# Patient Record
Sex: MALE | Race: WHITE | NOT HISPANIC OR LATINO | Employment: FULL TIME | URBAN - NONMETROPOLITAN AREA
[De-identification: names, ages, dates, MRNs, and addresses within clinical notes are randomized per-mention and may not be internally consistent; named-entity substitution may affect disease eponyms.]

---

## 2018-03-10 ENCOUNTER — HOSPITAL ENCOUNTER (EMERGENCY)
Facility: HOSPITAL | Age: 28
Discharge: HOME | End: 2018-03-10
Attending: FAMILY MEDICINE | Admitting: FAMILY MEDICINE

## 2018-03-10 VITALS
RESPIRATION RATE: 16 BRPM | TEMPERATURE: 98.2 F | OXYGEN SATURATION: 94 % | DIASTOLIC BLOOD PRESSURE: 82 MMHG | HEART RATE: 95 BPM | SYSTOLIC BLOOD PRESSURE: 138 MMHG

## 2018-03-10 DIAGNOSIS — S67.32XA: ICD-10-CM

## 2018-03-10 PROCEDURE — 27210995 ZZH RX 272: Performed by: FAMILY MEDICINE

## 2018-03-10 PROCEDURE — 25000132 ZZH RX MED GY IP 250 OP 250 PS 637: Performed by: FAMILY MEDICINE

## 2018-03-10 PROCEDURE — 99283 EMERGENCY DEPT VISIT LOW MDM: CPT

## 2018-03-10 PROCEDURE — 99283 EMERGENCY DEPT VISIT LOW MDM: CPT | Performed by: FAMILY MEDICINE

## 2018-03-10 RX ORDER — HYDROCODONE BITARTRATE AND ACETAMINOPHEN 5; 325 MG/1; MG/1
2 TABLET ORAL EVERY 6 HOURS PRN
Qty: 15 TABLET | Refills: 0 | Status: SHIPPED | OUTPATIENT
Start: 2018-03-10 | End: 2019-04-22

## 2018-03-10 RX ORDER — HYDROCODONE BITARTRATE AND ACETAMINOPHEN 5; 325 MG/1; MG/1
2 TABLET ORAL ONCE
Status: COMPLETED | OUTPATIENT
Start: 2018-03-10 | End: 2018-03-10

## 2018-03-10 RX ORDER — IBUPROFEN 800 MG/1
800 TABLET, FILM COATED ORAL ONCE
Status: COMPLETED | OUTPATIENT
Start: 2018-03-10 | End: 2018-03-10

## 2018-03-10 RX ORDER — IBUPROFEN 800 MG/1
800 TABLET, FILM COATED ORAL EVERY 8 HOURS PRN
Qty: 60 TABLET | Refills: 0 | Status: SHIPPED | OUTPATIENT
Start: 2018-03-10 | End: 2018-03-18

## 2018-03-10 RX ADMIN — IBUPROFEN 800 MG: 800 TABLET ORAL at 16:26

## 2018-03-10 RX ADMIN — HYDROCODONE BITARTRATE AND ACETAMINOPHEN 2 TABLET: 5; 325 TABLET ORAL at 16:26

## 2018-03-10 RX ADMIN — Medication: at 16:27

## 2018-03-10 ASSESSMENT — ENCOUNTER SYMPTOMS
ACTIVITY CHANGE: 1
DIAPHORESIS: 0
WEAKNESS: 0
PSYCHIATRIC NEGATIVE: 1
MYALGIAS: 1
GASTROINTESTINAL NEGATIVE: 1
FATIGUE: 0
ARTHRALGIAS: 1
NUMBNESS: 1
SHORTNESS OF BREATH: 0
FEVER: 0

## 2018-03-10 NOTE — ED PROVIDER NOTES
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  By the time he was able to get it out there was a small laceration on the ventral side at the flexure of the wrist and hand, and there was also a abrasion on the dorsal side.  The laceration is flap-like, superficial, does not require suturing.  The patient's wrist is extremely painful and the hand is jenae in color, capillary refill is less than 1 second.  Patient is having 8 of 10 pain, states that shoots from his wrist down into his hand and up almost to his elbow.    Problem List:    There are no active problems to display for this patient.     Past Medical History:    No past medical history on file.    Past Surgical History:    No past surgical history on file.    Family History:    No family history on file.    Social History:  Marital Status:  Single [1]  Social History   Substance Use Topics     Smoking status: Not on file     Smokeless tobacco: Not on file     Alcohol use Not on file        Medications:      CITALOPRAM HYDROBROMIDE PO   Omeprazole (PRILOSEC PO)   LISINOPRIL PO   ibuprofen (ADVIL/MOTRIN) 800 MG tablet   HYDROcodone-acetaminophen (NORCO) 5-325 MG per tablet         Review of Systems   Constitutional: Positive for activity change. Negative for diaphoresis, fatigue and fever.   HENT: Negative.         Did not fall or hit head.   Respiratory: Negative for shortness of breath.    Cardiovascular: Negative for chest pain.   Gastrointestinal: Negative.    Genitourinary: Negative.    Musculoskeletal: Positive for arthralgias and myalgias.        Right wrist   Skin: Negative.    Neurological: Positive for numbness. Negative for weakness.        Fingers on left hand partially numb.  Normal handgrasp.   Psychiatric/Behavioral: Negative.         Physical Exam   BP: 115/54  Pulse: 95  Heart Rate: 88  Temp: 98  F (36.7  C)  Resp: 16  SpO2: 99 %      Physical Exam   Constitutional: He is oriented to person, place, and time. He appears well-developed and well-nourished. No distress.   Cardiovascular: Normal rate, regular rhythm and normal heart sounds.    No murmur heard.  Pulmonary/Chest: Effort normal and breath sounds normal. No respiratory distress.   Musculoskeletal: He exhibits edema and tenderness. He exhibits no deformity.        Left wrist: He exhibits decreased range of motion, tenderness and swelling. He exhibits no deformity.   Neurological: He is alert and oriented to person, place, and time.   Skin: Skin is warm and dry.   Psychiatric: He has a normal mood and affect.   Nursing note and vitals reviewed.      ED Course     ED Course     Procedures    No results found for this or any previous visit (from the past 24 hour(s)).    Assessments & Plan (with Medical Decision Making)   Superficial laceration on the patient's wrist was glued using skin adhesive.  The wrist was wrapped lightly with gauze for protection, placed in a Velcro wrist splint.  Patient was advised to keep the hand above the level of the elbow and to move the fingers and exercise them while in splint.  He can remove the splint for bathing, advised him to wear it continuously for 4 days otherwise.  Patient returns to the emergency room or urgent care for pain could consider x-ray.  At this time patient had no bony tenderness on palpation.  He did have significant soft tissue tenderness.  Given ibuprofen and Norco for pain in the emergency room, same prescription sent home with him for use as needed.  Did advise him to take the ibuprofen regularly for 3 days.  Follow-up as needed.    I have reviewed the nursing notes.    I have reviewed the findings, diagnosis, plan and need for follow up with the patient.  Discharge Medication List as of 3/10/2018  5:16 PM      START taking  these medications    Details   ibuprofen (ADVIL/MOTRIN) 800 MG tablet Take 1 tablet (800 mg) by mouth every 8 hours as needed for moderate pain (take regularly for 3 days), Disp-60 tablet, R-0, E-Prescribe      HYDROcodone-acetaminophen (NORCO) 5-325 MG per tablet Take 2 tablets by mouth every 6 hours as needed for moderate to severe pain, Disp-15 tablet, R-0, Local Print             Final diagnoses:   Crush injury, wrist, left, initial encounter       3/10/2018   [facility]     Bronwyn Cline MD  03/10/18 6816

## 2018-03-10 NOTE — ED NOTES
Discharge instructions reviewed with patient with no questions or concerns. Written Rx sent with patient. Aware of Rx to be picked up. Sling and wrist brace placed on patient by this writer. Vital signs stable.

## 2018-03-10 NOTE — ED NOTES
Patient arrives by self with laceration to left wrist. 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 6-7 cm linear  laceration noted to left wrist. Patient's currently rating the pain to hand 8/10. Bleeding controlled. CMS intact. Call light within reach.

## 2018-03-10 NOTE — ED AVS SNAPSHOT
[facility]    [address] [address]    Phone:  [phone]                                       Carter Ervin   MRN: [id number]    Department:  [facility]   Date of Visit:  3/10/2018           Patient Information     Date Of Birth          1990        Your diagnoses for this visit were:     Crush injury, wrist, left, initial encounter        You were seen by Bronwyn Cline MD.      Follow-up Information     Follow up with [facility] In 2 days.    Specialty:  EMERGENCY MEDICINE    Why:  As needed, If symptoms worsen, or fail to improve    Contact information:    [address] [address] [phone]    Additional information:    From [facility]: Take [address]. Turn left at [address]. Turn left at the first stoplight on [address]. At the first stop sign, take a right onto [address]. Take a left into the parking lot and continue through until you reach the North enterance of the building.       From [place]: Take [address]. Take the [address] ramp towards [place]. Turn left onto [address]. Take a slight right onto [address] [facility]. Turn left at the first stoplight on [address]. At the first stop sign, take a right onto [address]. Take a left into the parking lot and continue through until you reach the North enterance of the building.       From [place]: Take [address]. Take a right at [address]. At the first stop sign, take a right onto [address]. Take a left into the parking lot and continue through until you reach the North enterance of the building.       Discharge References/Attachments     SOFT TISSUE CONTUSION (ENGLISH)         Review of your medicines      START taking        Dose / Directions Last dose taken    HYDROcodone-acetaminophen 5-325 MG per tablet   Commonly known as:  NORCO   Dose:  2 tablet  "  Quantity:  15 tablet        Take 2 tablets by mouth every 6 hours as needed for moderate to severe pain   Refills:  0        ibuprofen 800 MG tablet   Commonly known as:  ADVIL/MOTRIN   Dose:  800 mg   Quantity:  60 tablet        Take 1 tablet (800 mg) by mouth every 8 hours as needed for moderate pain (take regularly for 3 days)   Refills:  0          Our records show that you are taking the medicines listed below. If these are incorrect, please call your family doctor or clinic.        Dose / Directions Last dose taken    CITALOPRAM HYDROBROMIDE PO        Refills:  0        LISINOPRIL PO        Refills:  0        PRILOSEC PO        Refills:  0                Prescriptions were sent or printed at these locations (2 Prescriptions)                   [id number] - [place], [place] - [address] AT [facility] [address], [place] [address]    Telephone:  [phone]   Fax:  [phone]   Hours:                  E-Prescribed (1 of 2)         ibuprofen (ADVIL/MOTRIN) 800 MG tablet                 Printed at Department/Unit printer (1 of 2)         HYDROcodone-acetaminophen (NORCO) 5-325 MG per tablet                Orders Needing Specimen Collection     None      Pending Results     No orders found from 3/8/2018 to 3/11/2018.            Pending Culture Results     No orders found from 3/8/2018 to 3/11/2018.            Thank you for choosing [facility]       Thank you for choosing [facility] for your care. Our goal is always to provide you with excellent care. Hearing back from our patients is one way we can continue to improve our services. Please take a few minutes to complete the written survey that you may receive in the mail after you visit with us. Thank you!        [employer] Information     [employer] lets you send messages to your doctor, view your test results, renew your prescriptions, schedule appointments and more. To sign up, go to [email or web address] . Click on \"Log in\" on " "the left side of the screen, which will take you to the Welcome page. Then click on \"Sign up Now\" on the right side of the page.     You will be asked to enter the access code listed below, as well as some personal information. Please follow the directions to create your username and password.     Your access code is: [email or web address]  Expires: 2018  5:16 PM     Your access code will  in 90 days. If you need help or a new code, please call your [facility] clinic or [phone].        Care EveryWhere ID     This is your Care EveryWhere ID. This could be used by other organizations to access your [facility] medical records  [id number]        Equal Access to Services     BOB YANEZ : Meron Lucero, barrera sue, lenard smith, hyacinth martinez. So [facility] [phone].    ATENCIÓN: Si habla español, tiene a pal disposición servicios gratuitos de asistencia lingüística. Llame al [phone].    We comply with applicable federal civil rights laws and [place] laws. We do not discriminate on the basis of race, color, national origin, age, disability, sex, sexual orientation, or gender identity.            After Visit Summary       This is your record. Keep this with you and show to your community pharmacist(s) and doctor(s) at your next visit.                  "

## 2018-03-10 NOTE — ED AVS SNAPSHOT
[facility]    [address] [place] [address]    Phone:  [phone]                                       Carter Ervin   MRN: [id number]    Department:  [facility]   Date of Visit:  3/10/2018           After Visit Summary Signature Page     I have received my discharge instructions, and my questions have been answered. I have discussed any challenges I see with this plan with the nurse or doctor.    ..........................................................................................................................................  Patient/Patient Representative Signature      ..........................................................................................................................................  Patient Representative Print Name and Relationship to Patient    ..................................................               ................................................  Date                                            Time    ..........................................................................................................................................  Reviewed by Signature/Title    ...................................................              ..............................................  Date                                                            Time

## 2018-11-12 ENCOUNTER — HOSPITAL ENCOUNTER (EMERGENCY)
Facility: HOSPITAL | Age: 28
Discharge: HOME | End: 2018-11-12
Attending: PHYSICIAN ASSISTANT | Admitting: PHYSICIAN ASSISTANT

## 2018-11-12 VITALS
TEMPERATURE: 97.4 F | SYSTOLIC BLOOD PRESSURE: 135 MMHG | DIASTOLIC BLOOD PRESSURE: 97 MMHG | OXYGEN SATURATION: 99 % | RESPIRATION RATE: 16 BRPM

## 2018-11-12 DIAGNOSIS — R51.9 SINUS HEADACHE: ICD-10-CM

## 2018-11-12 DIAGNOSIS — J20.8 ACUTE BRONCHITIS DUE TO OTHER SPECIFIED ORGANISMS: ICD-10-CM

## 2018-11-12 DIAGNOSIS — J01.10 ACUTE FRONTAL SINUSITIS, RECURRENCE NOT SPECIFIED: ICD-10-CM

## 2018-11-12 PROCEDURE — 99202 OFFICE O/P NEW SF 15 MIN: CPT | Performed by: PHYSICIAN ASSISTANT

## 2018-11-12 PROCEDURE — G0463 HOSPITAL OUTPT CLINIC VISIT: HCPCS

## 2018-11-12 RX ORDER — GUAIFENESIN AND PSEUDOEPHEDRINE HCL 1200; 120 MG/1; MG/1
1 TABLET, EXTENDED RELEASE ORAL 2 TIMES DAILY
Qty: 28 TABLET | Refills: 0 | Status: SHIPPED | OUTPATIENT
Start: 2018-11-12 | End: 2019-04-22

## 2018-11-12 RX ORDER — KETOROLAC TROMETHAMINE 10 MG/1
10 TABLET, FILM COATED ORAL EVERY 6 HOURS PRN
Qty: 10 TABLET | Refills: 0 | Status: SHIPPED | OUTPATIENT
Start: 2018-11-12 | End: 2019-04-22

## 2018-11-12 ASSESSMENT — ENCOUNTER SYMPTOMS
SINUS PAIN: 1
EYE DISCHARGE: 0
CARDIOVASCULAR NEGATIVE: 1
NECK STIFFNESS: 0
TROUBLE SWALLOWING: 0
FEVER: 0
COUGH: 1
VOICE CHANGE: 0
SINUS PRESSURE: 1
SORE THROAT: 0
ABDOMINAL PAIN: 0
HEADACHES: 1
DIZZINESS: 0
NECK PAIN: 0
PSYCHIATRIC NEGATIVE: 1
APPETITE CHANGE: 0
DIARRHEA: 0
LIGHT-HEADEDNESS: 0
EYE REDNESS: 0
VOMITING: 0
FATIGUE: 1
NAUSEA: 0

## 2018-11-12 NOTE — ED AVS SNAPSHOT
[facility]    [address] [place] [address]    Phone:  [phone]                                       Carter Ervin   MRN: [id number]    Department:  [facility]   Date of Visit:  11/12/2018           After Visit Summary Signature Page     I have received my discharge instructions, and my questions have been answered. I have discussed any challenges I see with this plan with the nurse or doctor.    ..........................................................................................................................................  Patient/Patient Representative Signature      ..........................................................................................................................................  Patient Representative Print Name and Relationship to Patient    ..................................................               ................................................  Date                                   Time    ..........................................................................................................................................  Reviewed by Signature/Title    ...................................................              ..............................................  Date                                               Time          22EPIC Rev 08/18

## 2018-11-12 NOTE — ED PROVIDER NOTES
History     Chief Complaint   Patient presents with     Headache     c/o h/a     Cough     c/o horrible cough     The history is provided by the patient. No  was used.     Crater Evrin is a 28 year old male who has 2.5 weeks frontal sinus pain/pressure and now has a sinus headache and decreased energy. No n/v/d/f/c.  No rash. No change in b/b habits. No sore throat          PMH: not pertinent  PSH: not pertinent  FHX: not pertinent    Social History:  Marital Status:  Single [1]  Social History   Substance Use Topics     Smoking status: Not on file     Smokeless tobacco: Not on file     Alcohol use Not on file        Medications:      amoxicillin-clavulanate (AUGMENTIN) 875-125 MG per tablet   ketorolac (TORADOL) 10 MG tablet   PseudoePHEDrine-guaiFENesin 120-1200 MG TB12   CITALOPRAM HYDROBROMIDE PO   HYDROcodone-acetaminophen (NORCO) 5-325 MG per tablet   LISINOPRIL PO   Omeprazole (PRILOSEC PO)         Review of Systems   Constitutional: Positive for fatigue. Negative for appetite change and fever.   HENT: Positive for congestion, sinus pain and sinus pressure. Negative for ear pain, sore throat, trouble swallowing and voice change.    Eyes: Negative for discharge and redness.   Respiratory: Positive for cough.    Cardiovascular: Negative.    Gastrointestinal: Negative for abdominal pain, diarrhea, nausea and vomiting.   Genitourinary: Negative.    Musculoskeletal: Negative for neck pain and neck stiffness.   Skin: Negative for rash.   Neurological: Positive for headaches. Negative for dizziness and light-headedness.   Psychiatric/Behavioral: Negative.        Physical Exam   BP: 135/97  Heart Rate: 69  Temp: 97.4  F (36.3  C)  Resp: 16  SpO2: 99 %      Physical Exam   Constitutional: He is oriented to person, place, and time. He appears well-developed and well-nourished. No distress.   HENT:   Head: Normocephalic and atraumatic.   Right Ear: External ear normal.   Left Ear: External ear  normal.   Mouth/Throat: Oropharynx is clear and moist.   Bilateral TMs/canals clear/wnl  frontal sinus TTP     Eyes: Conjunctivae and EOM are normal. Right eye exhibits no discharge. Left eye exhibits no discharge.   Neck: Normal range of motion. Neck supple.   Cardiovascular: Normal rate, regular rhythm and normal heart sounds.    Pulmonary/Chest: Effort normal and breath sounds normal. No respiratory distress.   Abdominal: Soft. Bowel sounds are normal. He exhibits no distension. There is no tenderness.   Neurological: He is alert and oriented to person, place, and time.   Skin: Skin is warm and dry. No rash noted. He is not diaphoretic.   Psychiatric: He has a normal mood and affect.   Nursing note and vitals reviewed.      ED Course     ED Course     Procedures            No results found for this or any previous visit (from the past 24 hour(s)).    Medications - No data to display    Assessments & Plan (with Medical Decision Making)     I have reviewed the nursing notes.    I have reviewed the findings, diagnosis, plan and need for follow up with the patient.      Discharge Medication List as of 11/12/2018  2:23 PM      START taking these medications    Details   amoxicillin-clavulanate (AUGMENTIN) 875-125 MG per tablet Take 1 tablet by mouth 2 times daily, Disp-20 tablet, R-0, E-Prescribe      ketorolac (TORADOL) 10 MG tablet Take 1 tablet (10 mg) by mouth every 6 hours as needed for moderate pain, Disp-10 tablet, R-0, E-Prescribe      PseudoePHEDrine-guaiFENesin 120-1200 MG TB12 Take 1 tablet by mouth 2 times daily, Disp-28 tablet, R-0, Local Print             Final diagnoses:   Acute frontal sinusitis, recurrence not specified   Sinus headache   Acute bronchitis due to other specified organisms         Give children's motrin as directed on the bottle as directed as needed for pain/swelling or fever.   Increase fluids, wash hands often.  Parent verbally educated and given appropriate education sheets for the  diagnoses and has no questions.  Give medications as directed.   Follow up with Primary Care provider if symptoms increase or if concerns develop, return to the ER  Fanta Becker Certified  Physician Assistant  11/12/2018  3:01 PM  URGENT CARE CLINIC  11/12/2018   [facility]     Fanta Becker PA  11/12/18 3056

## 2018-11-12 NOTE — ED AVS SNAPSHOT
[facility]    [address] [address]    Phone:  [phone]                                       Carter Ervin   MRN: [id number]    Department:  [facility]   Date of Visit:  11/12/2018           Patient Information     Date Of Birth          1990        Your diagnoses for this visit were:     Acute frontal sinusitis, recurrence not specified     Sinus headache     Acute bronchitis due to other specified organisms        You were seen by Fanta Becker PA.      Follow-up Information     Please follow up.    Why:  If symptoms worsen, with a Primary Care Provider or Urgent Care        Follow up with [facility].    Specialty:  EMERGENCY MEDICINE    Why:  If further concerns develop    Contact information:    [address] [address] [phone]    Additional information:    From [facility]: Take [address]. Turn left at [address]. Turn left at the first stoplight on [address]. At the first stop sign, take a right onto [address]. Take a left into the parking lot and continue through until you reach the North enterance of the building.       From [place]: Take [address]. Take the [address] ramp towards [place]. Turn left onto [address]. Take a slight right onto [address]. Turn left at the first stoplight on [address]. At the first stop sign, take a right onto [address]. Take a left into the parking lot and continue through until you reach the North enterance of the building.       From [place]: Take [address]. Take a right at [address]. At the first stop sign, take a right onto [address]. Take a left into the parking lot and continue through until you reach the North enterance of the building.       Discharge References/Attachments     HEADACHES, SINUS (ENGLISH)    SINUSITIS (ANTIBIOTIC TREATMENT) (ENGLISH)    BRONCHITIS,  ANTIBIOTIC TREATMENT (ADULT) (ENGLISH)         Review of your medicines      START taking        Dose / Directions Last dose taken    amoxicillin-clavulanate 875-125 MG per tablet   Commonly known as:  AUGMENTIN   Dose:  1 tablet   Quantity:  20 tablet        Take 1 tablet by mouth 2 times daily   Refills:  0        ketorolac 10 MG tablet   Commonly known as:  TORADOL   Dose:  10 mg   Quantity:  10 tablet        Take 1 tablet (10 mg) by mouth every 6 hours as needed for moderate pain   Refills:  0        PseudoePHEDrine-guaiFENesin 120-1200 MG Tb12   Dose:  1 tablet   Quantity:  28 tablet        Take 1 tablet by mouth 2 times daily   Refills:  0          Our records show that you are taking the medicines listed below. If these are incorrect, please call your family doctor or clinic.        Dose / Directions Last dose taken    CITALOPRAM HYDROBROMIDE PO        Refills:  0        HYDROcodone-acetaminophen 5-325 MG per tablet   Commonly known as:  NORCO   Dose:  2 tablet   Quantity:  15 tablet        Take 2 tablets by mouth every 6 hours as needed for moderate to severe pain   Refills:  0        LISINOPRIL PO        Refills:  0        PRILOSEC PO        Refills:  0                Prescriptions were sent or printed at these locations (3 Prescriptions)                   [id number] - [place] - [address] [address] [address] [place] [address]    Telephone:  [phone]   Fax:  [phone]   Hours:                  E-Prescribed (2 of 3)         amoxicillin-clavulanate (AUGMENTIN) 875-125 MG per tablet               ketorolac (TORADOL) 10 MG tablet                 Printed at Department/Unit printer (1 of 3)         PseudoePHEDrine-guaiFENesin 120-1200 MG TB12                Orders Needing Specimen Collection     None      Pending Results     No orders found from 11/10/2018 to 11/13/2018.            Pending Culture Results     No orders found from 11/10/2018 to 11/13/2018.            Thank you for  "choosing [facility]       Thank you for choosing [facility] for your care. Our goal is always to provide you with excellent care. Hearing back from our patients is one way we can continue to improve our services. Please take a few minutes to complete the written survey that you may receive in the mail after you visit with us. Thank you!        [facility] Information     [employer] lets you send messages to your doctor, view your test results, renew your prescriptions, schedule appointments and more. To sign up, go to [email or web address] . Click on \"Log in\" on the left side of the screen, which will take you to the Welcome page. Then click on \"Sign up Now\" on the right side of the page.     You will be asked to enter the access code listed below, as well as some personal information. Please follow the directions to create your username and password.     Your access code is: [email or web address]  Expires: 2/10/2019  2:23 PM     Your access code will  in 90 days. If you need help or a new code, please call your [facility] clinic or [phone].        Care EveryWhere ID     This is your Care EveryWhere ID. This could be used by other organizations to access your [facility] medical records  [id number]        Equal Access to Services     BOB YANEZ AH: Meron kleino Sofiona, waaxda luqadaha, qaybta kaalmada adeegyada, hyacinth martinez. So [facility] [phone].    ATENCIÓN: Si habla español, tiene a pal disposición servicios gratuitos de asistencia lingüística. Llame al [phone].    We comply with applicable federal civil rights laws and [place] laws. We do not discriminate on the basis of race, color, national origin, age, disability, sex, sexual orientation, or gender identity.            After Visit Summary       This is your record. Keep this with you and show to your community pharmacist(s) and doctor(s) at your next visit.                  "

## 2019-04-22 ENCOUNTER — HOSPITAL ENCOUNTER (EMERGENCY)
Facility: HOSPITAL | Age: 29
Discharge: HOME | End: 2019-04-22
Attending: PHYSICIAN ASSISTANT | Admitting: PHYSICIAN ASSISTANT
Payer: COMMERCIAL

## 2019-04-22 ENCOUNTER — APPOINTMENT (OUTPATIENT)
Dept: GENERAL RADIOLOGY | Facility: HOSPITAL | Age: 29
End: 2019-04-22
Attending: NURSE PRACTITIONER
Payer: COMMERCIAL

## 2019-04-22 VITALS
RESPIRATION RATE: 20 BRPM | OXYGEN SATURATION: 97 % | BODY MASS INDEX: 27.4 KG/M2 | DIASTOLIC BLOOD PRESSURE: 86 MMHG | WEIGHT: 185 LBS | TEMPERATURE: 99.5 F | SYSTOLIC BLOOD PRESSURE: 134 MMHG | HEIGHT: 69 IN | HEART RATE: 104 BPM

## 2019-04-22 DIAGNOSIS — J06.9 VIRAL UPPER RESPIRATORY TRACT INFECTION WITH COUGH: ICD-10-CM

## 2019-04-22 LAB
DEPRECATED S PYO AG THROAT QL EIA: NORMAL
SPECIMEN SOURCE: NORMAL

## 2019-04-22 PROCEDURE — 71046 X-RAY EXAM CHEST 2 VIEWS: CPT | Mod: TC

## 2019-04-22 PROCEDURE — 99213 OFFICE O/P EST LOW 20 MIN: CPT | Mod: Z6 | Performed by: PHYSICIAN ASSISTANT

## 2019-04-22 PROCEDURE — 87880 STREP A ASSAY W/OPTIC: CPT | Performed by: NURSE PRACTITIONER

## 2019-04-22 PROCEDURE — G0463 HOSPITAL OUTPT CLINIC VISIT: HCPCS | Mod: 25

## 2019-04-22 PROCEDURE — G0463 HOSPITAL OUTPT CLINIC VISIT: HCPCS

## 2019-04-22 PROCEDURE — 87081 CULTURE SCREEN ONLY: CPT | Performed by: NURSE PRACTITIONER

## 2019-04-22 RX ORDER — BENZONATATE 100 MG/1
100 CAPSULE ORAL 3 TIMES DAILY PRN
Qty: 9 CAPSULE | Refills: 0 | Status: SHIPPED | OUTPATIENT
Start: 2019-04-22 | End: 2021-12-23

## 2019-04-22 ASSESSMENT — ENCOUNTER SYMPTOMS
TROUBLE SWALLOWING: 0
CARDIOVASCULAR NEGATIVE: 1
FATIGUE: 0
HEADACHES: 1
EYE REDNESS: 0
COUGH: 1
DIZZINESS: 0
NECK STIFFNESS: 0
NECK PAIN: 0
CHEST TIGHTNESS: 1
DIARRHEA: 0
SINUS PRESSURE: 1
EYE DISCHARGE: 0
VOMITING: 0
NAUSEA: 0
SHORTNESS OF BREATH: 1
RHINORRHEA: 1
SORE THROAT: 1
MYALGIAS: 0
SINUS PAIN: 1
ACTIVITY CHANGE: 1
LIGHT-HEADEDNESS: 0

## 2019-04-22 ASSESSMENT — MIFFLIN-ST. JEOR: SCORE: 1794.53

## 2019-04-22 NOTE — ED AVS SNAPSHOT
[facility]  [address] [address]  Phone:  [phone]                                    Carter Ervin   MRN: [id number]    Department:  [facility]   Date of Visit:  4/22/2019           After Visit Summary Signature Page    I have received my discharge instructions, and my questions have been answered. I have discussed any challenges I see with this plan with the nurse or doctor.    ..........................................................................................................................................  Patient/Patient Representative Signature      ..........................................................................................................................................  Patient Representative Print Name and Relationship to Patient    ..................................................               ................................................  Date                                   Time    ..........................................................................................................................................  Reviewed by Signature/Title    ...................................................              ..............................................  Date                                               Time          22EPIC Rev 08/18

## 2019-04-22 NOTE — ED PROVIDER NOTES
History     Chief Complaint   Patient presents with     Cough     5 day hx of productive cough of yellow green sputum, chest muscles sore     HPI  Carter Ervin is a 28 year old male who presents with a cough, congestion, sore throat, headaches, sinus pressure, and minimal shortness of breath that started five days ago. He is unsure if he has had fevers. He is an independent  and denies being around anyone else who has been sick. His chest is sore from coughing.     Allergies:  Allergies   Allergen Reactions     Morphine Other (See Comments)     insomnia       Problem List:    There are no active problems to display for this patient.       Past Medical History:    History reviewed. No pertinent past medical history.    Past Surgical History:    History reviewed. No pertinent surgical history.    Family History:    No family history on file.    Social History:  Marital Status:  Single [1]  Social History     Tobacco Use     Smoking status: None   Substance Use Topics     Alcohol use: None     Drug use: None        Medications:      benzonatate (TESSALON) 100 MG capsule   CITALOPRAM HYDROBROMIDE PO   LISINOPRIL PO   Omeprazole (PRILOSEC PO)         Review of Systems   Constitutional: Positive for activity change. Negative for fatigue.   HENT: Positive for congestion, postnasal drip, rhinorrhea, sinus pressure, sinus pain, sneezing and sore throat. Negative for ear pain and trouble swallowing.         Unsure if he has had fevers or chills.   Eyes: Negative for discharge and redness.   Respiratory: Positive for cough, chest tightness and shortness of breath.    Cardiovascular: Negative.    Gastrointestinal: Negative for diarrhea, nausea and vomiting.        Threw up this am from coughing   Musculoskeletal: Negative for myalgias, neck pain and neck stiffness.   Skin: Negative for rash.   Neurological: Positive for headaches. Negative for dizziness and light-headedness.       Physical Exam   BP:  "134/86  Pulse: 104  Temp: 99.5  F (37.5  C)  Resp: 20  Height: 175.3 cm (5' 9\")  Weight: 83.9 kg (185 lb)(stated)  SpO2: 97 %      Physical Exam   Constitutional: He is oriented to person, place, and time. He appears well-developed and well-nourished. He appears distressed.   HENT:   Head: Normocephalic.   Right Ear: External ear normal.   Left Ear: External ear normal.   Mouth/Throat: Oropharynx is clear and moist. No oropharyngeal exudate.   Eyes: Conjunctivae are normal.   Cardiovascular: Normal rate, regular rhythm and normal heart sounds.   No murmur heard.  Pulmonary/Chest: Effort normal and breath sounds normal. No respiratory distress. He has no wheezes. He has no rales. He exhibits tenderness.   Chest tenderness noted from coughing   Musculoskeletal: Normal range of motion.   Lymphadenopathy:     He has cervical adenopathy.        Left cervical: Superficial cervical adenopathy present. No posterior cervical adenopathy present.   Neurological: He is alert and oriented to person, place, and time.   Skin: Skin is warm and dry. No rash noted.   Psychiatric: He has a normal mood and affect.   Nursing note and vitals reviewed.      ED Course        Procedures               Results for orders placed or performed during the hospital encounter of 04/22/19 (from the past 24 hour(s))   Rapid strep screen   Result Value Ref Range    Specimen Description Throat     Rapid Strep A Screen       NEGATIVE: No Group A streptococcal antigen detected by immunoassay, await culture report.   Chest XR,  PA & LAT    Narrative    Procedure:XR CHEST 2 VW    Clinical history:Male, 28 years, shortness of breath, cough    Technique: Two views are submitted.    Comparison: None    Findings: The cardiac silhouette is normal. The pulmonary vasculature  is normal.    The lungs are clear. Bony structures are unremarkable.      Impression    Impression:   No acute abnormality. No evidence of acute or active disease.    EDDIE SHEFFIELD MD "       Medications - No data to display    Assessments & Plan (with Medical Decision Making)     I have reviewed the nursing notes.    I have reviewed the findings, diagnosis, plan and need for follow up with the patient.  Upper respiratory illness with cough, viral. Tessalon Perles for cough, treat other symptoms with decongestants . Discharge instructions reviewed and understanding verbalized.        Medication List      Started    benzonatate 100 MG capsule  Commonly known as:  TESSALON  100 mg, Oral, 3 TIMES DAILY PRN            Final diagnoses:   Viral upper respiratory tract infection with cough       4/22/2019   [facility]     Salena Glynn, CNP  04/22/19 [id number]

## 2019-04-22 NOTE — DISCHARGE INSTRUCTIONS
Treat body aches and pains with ibuprofen and acetaminophen. Follow-up with primary car provider if not better in 5 to 7 days

## 2019-04-22 NOTE — ED NOTES
Pt presents with cough for 5 days with chest pain from coughing along with headache. Productive cough with yellow sputum.

## 2019-04-24 LAB
BACTERIA SPEC CULT: NORMAL
SPECIMEN SOURCE: NORMAL

## 2020-03-24 ENCOUNTER — HOSPITAL ENCOUNTER (EMERGENCY)
Facility: HOSPITAL | Age: 30
Discharge: HOME | End: 2020-03-24
Attending: PHYSICIAN ASSISTANT | Admitting: PHYSICIAN ASSISTANT
Payer: COMMERCIAL

## 2020-03-24 VITALS
RESPIRATION RATE: 18 BRPM | DIASTOLIC BLOOD PRESSURE: 92 MMHG | SYSTOLIC BLOOD PRESSURE: 129 MMHG | TEMPERATURE: 98.1 F | OXYGEN SATURATION: 98 %

## 2020-03-24 DIAGNOSIS — J01.00 ACUTE MAXILLARY SINUSITIS, RECURRENCE NOT SPECIFIED: ICD-10-CM

## 2020-03-24 PROCEDURE — G0463 HOSPITAL OUTPT CLINIC VISIT: HCPCS

## 2020-03-24 PROCEDURE — 99213 OFFICE O/P EST LOW 20 MIN: CPT | Mod: Z6 | Performed by: PHYSICIAN ASSISTANT

## 2020-03-24 RX ORDER — ALBUTEROL SULFATE 90 UG/1
2-4 AEROSOL, METERED RESPIRATORY (INHALATION) EVERY 4 HOURS PRN
Qty: 1 INHALER | Refills: 0 | Status: SHIPPED | OUTPATIENT
Start: 2020-03-24 | End: 2021-12-23

## 2020-03-24 RX ORDER — AZITHROMYCIN 250 MG/1
TABLET, FILM COATED ORAL
Qty: 6 TABLET | Refills: 0 | Status: SHIPPED | OUTPATIENT
Start: 2020-03-24 | End: 2020-03-29

## 2020-03-24 RX ORDER — IBUPROFEN 800 MG/1
800 TABLET, FILM COATED ORAL EVERY 6 HOURS PRN
Qty: 60 TABLET | Refills: 0 | Status: SHIPPED | OUTPATIENT
Start: 2020-03-24 | End: 2020-04-01

## 2020-03-24 NOTE — ED AVS SNAPSHOT
[facility]  [address] [address]  Phone:  [phone]                                    Carter Ervin   MRN: [id number]    Department:  [facility]   Date of Visit:  3/24/2020           After Visit Summary Signature Page    I have received my discharge instructions, and my questions have been answered. I have discussed any challenges I see with this plan with the nurse or doctor.    ..........................................................................................................................................  Patient/Patient Representative Signature      ..........................................................................................................................................  Patient Representative Print Name and Relationship to Patient    ..................................................               ................................................  Date                                   Time    ..........................................................................................................................................  Reviewed by Signature/Title    ...................................................              ..............................................  Date                                               Time          22EPIC Rev 08/18

## 2020-03-24 NOTE — DISCHARGE INSTRUCTIONS
Ibuprofen as needed for headache and pain.  May take Tylenol 1 g every 6 hours do not exceed 4000 mg in 24-hour period.  Zithromax is the antibiotic.  Albuterol inhaler as needed.  Return for concerns new or worsening symptoms.

## 2020-03-24 NOTE — ED TRIAGE NOTES
"Pt presents today with c/o \"sick' for 7 day. Headache, nasal congestion, chest congestion, and worsening cough. No travel recently. No contact with any known covid-19. Pt has been trying day-quil and nyquil OTC.   "

## 2020-03-24 NOTE — ED PROVIDER NOTES
History     Chief Complaint   Patient presents with     Nasal Congestion     Cough     HPI  Carter Ervin is a 29 year old male who presents here with a 7-day history of nasal congestion sinus pain headache cough.  No fever or chills.  He does have a headache with the sinuses.  No neck pain or stiffness.  No sore throat.  No earache.  He does have a cough is nonproductive.  He is a non-smoker no history of asthma although he has used an inhaler in the past.  Not short of breath no abdominal pain no nausea vomiting diarrhea no chest pain.  No known Covid19 exposure    Allergies:  Allergies   Allergen Reactions     Morphine Other (See Comments)     insomnia       Problem List:    There are no active problems to display for this patient.       Past Medical History:    No past medical history on file.    Past Surgical History:    No past surgical history on file.    Family History:    No family history on file.    Social History:  Marital Status:  Single [1]  Social History     Tobacco Use     Smoking status: Not on file   Substance Use Topics     Alcohol use: Not on file     Drug use: Not on file        Medications:    albuterol (PROAIR HFA/PROVENTIL HFA/VENTOLIN HFA) 108 (90 Base) MCG/ACT inhaler  azithromycin (ZITHROMAX Z-SARAH) 250 MG tablet  CITALOPRAM HYDROBROMIDE PO  ibuprofen (ADVIL/MOTRIN) 800 MG tablet  LISINOPRIL PO  Omeprazole (PRILOSEC PO)  benzonatate (TESSALON) 100 MG capsule          Review of Systems  I did do a complete 10 point review of systems. Pertinent positives and negatives listed per HPI. All other systems have been reviewed and are negative.      Physical Exam   BP: 129/92  Heart Rate: 88  Temp: 98.1  F (36.7  C)  Resp: 18  SpO2: 98 %      Physical Exam  Vitals signs and nursing note reviewed.   Constitutional:       General: He is not in acute distress.     Appearance: Normal appearance. He is normal weight. He is not ill-appearing, toxic-appearing or diaphoretic.   HENT:      Head:  Normocephalic and atraumatic.      Nose:      Comments: Bilateral maxillary and frontal sinus tenderness.  Maxillary sinuses are exquisitely tender.  Eyes:      General: No scleral icterus.     Conjunctiva/sclera: Conjunctivae normal.   Neck:      Musculoskeletal: Neck supple. No neck rigidity.   Cardiovascular:      Rate and Rhythm: Normal rate and regular rhythm.   Pulmonary:      Effort: Pulmonary effort is normal. No respiratory distress.      Breath sounds: Normal breath sounds.   Musculoskeletal:         General: No swelling or signs of injury.   Skin:     General: Skin is warm and dry.   Neurological:      General: No focal deficit present.      Mental Status: He is alert and oriented to person, place, and time.   Psychiatric:         Mood and Affect: Mood normal.         Behavior: Behavior normal.         Thought Content: Thought content normal.         Judgment: Judgment normal.         ED Course      Clinically has sinusitis and upper respiratory infection will improve by azithromycin given albuterol inhaler since he has been used 1 in the past.  Have him take ibuprofen and Tylenol for pain.  Turn for concerns new or worsening symptoms.           No results found for this or any previous visit (from the past 24 hour(s)).    Medications - No data to display    Assessments & Plan (with Medical Decision Making)     I have reviewed the nursing notes.    I have reviewed the findings, diagnosis, plan and need for follow up with the patient.      Discharge Medication List as of 3/24/2020 11:23 AM      START taking these medications    Details   albuterol (PROAIR HFA/PROVENTIL HFA/VENTOLIN HFA) 108 (90 Base) MCG/ACT inhaler Inhale 2-4 puffs into the lungs every 4 hours as needed for shortness of breath / dyspnea or wheezing, Disp-1 Inhaler,R-0, E-Prescribe      azithromycin (ZITHROMAX Z-SARAH) 250 MG tablet Two tablets on the first day, then one tablet daily for the next 4 days, Disp-6 tablet,R-0, E-Prescribe       ibuprofen (ADVIL/MOTRIN) 800 MG tablet Take 1 tablet (800 mg) by mouth every 6 hours as needed for moderate pain or fever, Disp-60 tablet,R-0, E-Prescribe             Final diagnoses:   Acute maxillary sinusitis, recurrence not specified   Bronchittis    3/24/2020   [facility]

## 2020-03-24 NOTE — ED TRIAGE NOTES
Patient arrives by self for evaluation of cough, nasal congestion, and body aches for the past week. No fevers.

## 2021-12-23 ENCOUNTER — HOSPITAL ENCOUNTER (EMERGENCY)
Facility: HOSPITAL | Age: 31
Discharge: HOME | End: 2021-12-23
Attending: PHYSICIAN ASSISTANT | Admitting: PHYSICIAN ASSISTANT
Payer: COMMERCIAL

## 2021-12-23 VITALS — TEMPERATURE: 97.6 F | OXYGEN SATURATION: 98 % | RESPIRATION RATE: 18 BRPM | HEART RATE: 75 BPM

## 2021-12-23 DIAGNOSIS — Z76.0 ENCOUNTER FOR MEDICATION REFILL: ICD-10-CM

## 2021-12-23 PROCEDURE — G0463 HOSPITAL OUTPT CLINIC VISIT: HCPCS

## 2021-12-23 PROCEDURE — 99213 OFFICE O/P EST LOW 20 MIN: CPT | Performed by: PHYSICIAN ASSISTANT

## 2021-12-23 RX ORDER — LISINOPRIL 10 MG/1
TABLET ORAL
COMMUNITY
Start: 2021-09-16

## 2021-12-23 RX ORDER — BUSPIRONE HYDROCHLORIDE 5 MG/1
TABLET ORAL
COMMUNITY
Start: 2021-02-22 | End: 2023-04-10

## 2021-12-23 RX ORDER — TRAZODONE HYDROCHLORIDE 50 MG/1
50 TABLET, FILM COATED ORAL AT BEDTIME
COMMUNITY
End: 2024-06-06

## 2021-12-23 RX ORDER — LISINOPRIL 10 MG/1
10 TABLET ORAL DAILY
Qty: 14 TABLET | Refills: 0 | Status: SHIPPED | OUTPATIENT
Start: 2021-12-23 | End: 2022-03-31

## 2021-12-23 RX ORDER — CITALOPRAM HYDROBROMIDE 40 MG/1
TABLET ORAL
COMMUNITY
Start: 2021-09-16 | End: 2024-06-12

## 2021-12-23 RX ORDER — CITALOPRAM HYDROBROMIDE 40 MG/1
40 TABLET ORAL DAILY
Qty: 14 TABLET | Refills: 0 | Status: SHIPPED | OUTPATIENT
Start: 2021-12-23 | End: 2022-03-31

## 2021-12-23 NOTE — ED TRIAGE NOTES
Patient ran out of all medication and is requesting refills on Citalopram, Lisinopril, Omeprazole and trazodone.  Patient has not established with a PCP.  He states his doctor retired -Dr. Blackwell out of [place].

## 2021-12-23 NOTE — ED PROVIDER NOTES
History     Chief Complaint   Patient presents with     medication refills     HPI  Carter Ervin is a 31 year old male who presents to urgent care for refills on multiple medications. He states that his primary care provider in [place] retired approximately 1 month ago. He states that he ran out of his medications yesterday and that he thought he had one refill left. Patient is asking for refills of his trazodone, lisinopril, omeprazole, citalopram.    Allergies:  Allergies   Allergen Reactions     Morphine Other (See Comments)     insomnia       Problem List:    There are no problems to display for this patient.       Past Medical History:    No past medical history on file.    Past Surgical History:    No past surgical history on file.    Family History:    No family history on file.    Social History:  Marital Status:  Single [1]  Social History     Tobacco Use     Smoking status: Not on file     Smokeless tobacco: Not on file   Substance Use Topics     Alcohol use: Not on file     Drug use: Not on file        Medications:    citalopram (CELEXA) 40 MG tablet  lisinopril (ZESTRIL) 10 MG tablet  omeprazole (PRILOSEC) 20 MG DR capsule  traZODone (DESYREL) 50 MG tablet  busPIRone (BUSPAR) 5 MG tablet  citalopram (CELEXA) 40 MG tablet  lisinopril (ZESTRIL) 10 MG tablet  omeprazole (PRILOSEC) 20 MG DR capsule          Review of Systems   All other systems reviewed and are negative.      Physical Exam   Pulse: 75  Temp: 97.6  F (36.4  C)  Resp: 18  SpO2: 98 %      Physical Exam  Vitals and nursing note reviewed.   Constitutional:       Appearance: Normal appearance. He is not ill-appearing.   Cardiovascular:      Rate and Rhythm: Regular rhythm.      Heart sounds: Normal heart sounds.   Pulmonary:      Breath sounds: Normal breath sounds.   Neurological:      Mental Status: He is oriented to person, place, and time.         ED Course                 Procedures             Critical Care time:               No  results found for this or any previous visit (from the past 24 hour(s)).    Medications - No data to display    Assessments & Plan (with Medical Decision Making)   #1. Medication refill    Discussed exam findings with patient. I refilled patient's lisinopril, omeprazole and citalopram, for 14 days. I explained to patient that I would not refill his trazodone and that he needs to establish care with a primary care provider soon as possible. Patient verbalized understanding and agreement of plan.    I have reviewed the nursing notes.    I have reviewed the findings, diagnosis, plan and need for follow up with the patient.    New Prescriptions    CITALOPRAM (CELEXA) 40 MG TABLET    Take 1 tablet (40 mg) by mouth daily for 14 days    LISINOPRIL (ZESTRIL) 10 MG TABLET    Take 1 tablet (10 mg) by mouth daily for 14 days    OMEPRAZOLE (PRILOSEC) 20 MG DR CAPSULE    Take 1 capsule (20 mg) by mouth daily for 14 days       Final diagnoses:   Encounter for medication refill       12/23/2021   [facility]     Alex Feliciano PA-C  12/23/21 [id number]

## 2022-03-31 ENCOUNTER — HOSPITAL ENCOUNTER (EMERGENCY)
Facility: HOSPITAL | Age: 32
Discharge: HOME | End: 2022-03-31
Attending: NURSE PRACTITIONER | Admitting: NURSE PRACTITIONER
Payer: COMMERCIAL

## 2022-03-31 VITALS
SYSTOLIC BLOOD PRESSURE: 136 MMHG | TEMPERATURE: 98.6 F | HEART RATE: 99 BPM | OXYGEN SATURATION: 98 % | DIASTOLIC BLOOD PRESSURE: 99 MMHG | RESPIRATION RATE: 16 BRPM

## 2022-03-31 DIAGNOSIS — J01.00 ACUTE MAXILLARY SINUSITIS, RECURRENCE NOT SPECIFIED: ICD-10-CM

## 2022-03-31 PROCEDURE — 99213 OFFICE O/P EST LOW 20 MIN: CPT | Performed by: NURSE PRACTITIONER

## 2022-03-31 PROCEDURE — G0463 HOSPITAL OUTPT CLINIC VISIT: HCPCS

## 2022-03-31 ASSESSMENT — ENCOUNTER SYMPTOMS
SORE THROAT: 1
SINUS PAIN: 1
VOMITING: 0
WHEEZING: 0
APPETITE CHANGE: 0
MYALGIAS: 0
COUGH: 1
DIZZINESS: 1
NAUSEA: 0
SINUS PRESSURE: 1
CHILLS: 0
EYE PAIN: 0
SHORTNESS OF BREATH: 0
ACTIVITY CHANGE: 1
HEADACHES: 1
FEVER: 0
FATIGUE: 0
RHINORRHEA: 1

## 2022-03-31 NOTE — DISCHARGE INSTRUCTIONS
Increase oral intake, cool mist vaporizer as needed, rest, avoid sharing utensils, practice good hand washing techniques, cover mouth when you cough and sneeze. Throw toothbursh away 24 hours after starting antibiotics.  Over the counter medications such as ibuprofen and/or acetaminophen for fever and generalized aches and pains. Ibuprofen 400 to 800 mg (2 - 4 tabs of over the counter med) every six to eight hours as needed;not to exceed maximum amount of 3200 mg in 24 hours.Tylenol 650 to 1000 mg every four to six hours as needed (not to exceed more than 4000 mg in a 24 hour period). May use interchangeably. Robitussin (guaifenesin) for cough. Chest rubs such as Ludin's or Mentholatum may help reduce sore throat symptoms.  Chloraseptic spray for sore throat or menthol lozenges may be helpful for sore throat. Be reevaluated if symptoms persist longer than 10 - 14 days or worsen and if there is no improvement in 72 hours or worsening of symptoms.  Increase fluids. Complete all antibiotics even if feeling better. Taking antibiotics with food may decrease the stomach upset that can occur when taking antibiotics. Antibiotics frequently cause diarrhea. Probiotics or yogurt may help prevent or decrease these symptoms.     OTC decongestants (oral or topical).  Decongestants (oral or topical) cause vasoconstriction of the nasal mucosa.  We prefer oral pseudoephedrine to phenylephrine and other oral OTC nasal decongestants. Side effects of oral decongestants may include tachycardia, elevated diastolic blood pressure, and palpitations. Pseudoephedrine 30 to 60 mg every four to six hours as needed for congestion. (Maximum dose is 240 mg in 24 hours). Do not use longer than 72 hours.    Commonly used topical decongestants include oxymetazoline, xylometazoline, and phenylephrine. Side effects of topical decongestants include nosebleeds and drying of the nasal membranes. Topical decongestants should only be used for two to three  days; longer use may result in rebound nasal congestion after discontinuation.    Fluids, herbs, and foods for sore throat relief -- Adjusting the temperature and texture of foods and beverages may provide local relief of sore throat pain. While data showing benefit are quite limited, these approaches are intuitive. We typically advise these measures since they are likely to be safe with minimal adverse effect, and patients often describe relief of symptoms.  We suggest hydration with frozen (eg, ice or popsicles) or heated liquids (eg, teas, soups), rather than room temperature or refrigerated fluids in patient with significant sore throat pain. Very cold foods can have a numbing-like effect that temporarily reduces or alleviates the pain of swallowing. Ice cubes or frozen popsicles facilitate hydration; ice cream and frozen yogurt provide caloric intake.  Warm fluids and foods, including teas, soups, and soft non-irritating foods, may be better tolerated by patients with throat pain than irritating foods (eg, rough-textured or spicy foods) or fluids at room temperatures. Foods that coat the throat, including honey and hard candies, can facilitate intake of calories while temporarily relieving throat pain.

## 2022-03-31 NOTE — ED PROVIDER NOTES
History     Chief Complaint   Patient presents with     Sinusitis     x 1.5 weeks     HPI  Carter Ervin is a 31 year old male who presents to the urgent care with a 10 day history of congestion, cough, bilateral ear pain, sore throat, and facial pressure. He has a history of sinus infections every spring. Nobody else is ill in the home. He denies fevers, chills, rashes, n/v/d, and shortness of breath. He is a nonsmoker. Not vaccinated for Covid. He has taken acetaminophen and Mucinex with mild relief of symptoms.     RESPIRATORY SYMPTOMS      Duration: 1.5 weeks    Description  nasal congestion, sore throat, cough and ear pain bilateral    Severity: 6/10    Accompanying signs and symptoms: nasal congestion, sore throat, cough, and bilateral ear pain    History (predisposing factors):  none    Precipitating or alleviating factors: mucinex helped slightly    Therapies tried and outcome:  acetaminophen and mucinex      Allergies:  Allergies   Allergen Reactions     Morphine Other (See Comments)     insomnia       Problem List:    There are no problems to display for this patient.       Past Medical History:    History reviewed. No pertinent past medical history.    Past Surgical History:    History reviewed. No pertinent surgical history.    Family History:    History reviewed. No pertinent family history.    Social History:  Marital Status:  Single [1]  Social History     Tobacco Use     Smoking status: None     Smokeless tobacco: None   Substance Use Topics     Alcohol use: None     Drug use: None        Medications:    amoxicillin-clavulanate (AUGMENTIN) 875-125 MG tablet  citalopram (CELEXA) 40 MG tablet  lisinopril (ZESTRIL) 10 MG tablet  busPIRone (BUSPAR) 5 MG tablet  citalopram (CELEXA) 40 MG tablet  lisinopril (ZESTRIL) 10 MG tablet  omeprazole (PRILOSEC) 20 MG DR capsule  traZODone (DESYREL) 50 MG tablet          Review of Systems   Constitutional: Positive for activity change. Negative for appetite  change, chills, fatigue and fever.   HENT: Positive for congestion, ear pain, rhinorrhea, sinus pressure, sinus pain, sneezing and sore throat.    Eyes: Negative for pain.   Respiratory: Positive for cough. Negative for shortness of breath and wheezing.    Gastrointestinal: Negative for nausea and vomiting.   Musculoskeletal: Negative for myalgias.   Skin: Negative for pallor and rash.   Neurological: Positive for dizziness and headaches.   All other systems reviewed and are negative.      Physical Exam   BP: 136/99  Pulse: 99  Temp: 98.6  F (37  C)  Resp: 16  SpO2: 98 %      Physical Exam  Vitals and nursing note reviewed.   Constitutional:       General: He is in acute distress.      Appearance: Normal appearance. He is ill-appearing.   HENT:      Head: Normocephalic and atraumatic.      Jaw: No trismus, tenderness or pain on movement.      Right Ear: Hearing, tympanic membrane, ear canal and external ear normal. No decreased hearing noted. No drainage, swelling or tenderness. There is no impacted cerumen. No mastoid tenderness. Tympanic membrane is not injected, erythematous, retracted or bulging.      Left Ear: Hearing, tympanic membrane, ear canal and external ear normal. No decreased hearing noted. No drainage, swelling or tenderness. There is no impacted cerumen. No mastoid tenderness. Tympanic membrane is not injected, erythematous, retracted or bulging.      Nose: Congestion present. No nasal tenderness or rhinorrhea.      Right Sinus: Maxillary sinus tenderness present. No frontal sinus tenderness.      Left Sinus: Maxillary sinus tenderness present. No frontal sinus tenderness.      Mouth/Throat:      Lips: Pink.      Mouth: Mucous membranes are moist.      Tongue: No lesions.      Pharynx: Oropharynx is clear. No oropharyngeal exudate or posterior oropharyngeal erythema.   Eyes:      General:         Right eye: No discharge.         Left eye: No discharge.      Pupils: Pupils are equal, round, and  reactive to light.   Cardiovascular:      Rate and Rhythm: Normal rate and regular rhythm.      Pulses: Normal pulses.      Heart sounds: Normal heart sounds. No murmur heard.  Pulmonary:      Effort: Pulmonary effort is normal. No respiratory distress.      Breath sounds: Normal breath sounds. No stridor. No wheezing, rhonchi or rales.   Musculoskeletal:      Cervical back: No rigidity or tenderness.   Lymphadenopathy:      Cervical: No cervical adenopathy.   Skin:     General: Skin is warm and dry.      Capillary Refill: Capillary refill takes less than 2 seconds.   Neurological:      Mental Status: He is alert.   Psychiatric:         Behavior: Behavior is cooperative.         ED Course                 Procedures           No results found for this or any previous visit (from the past 24 hour(s)).    Medications - No data to display    Assessments & Plan (with Medical Decision Making)     I have reviewed the nursing notes.    I have reviewed the findings, diagnosis, plan and need for follow up with the patient.  (J01.00) Acute maxillary sinusitis, recurrence not specified  Comment: 31 year old male seen in the urgent care with complaints a 10 day history of facial pain and pressure, nasal congestion, cough, sore throat, and bilateral ear pain. He denies n/v/d, fever, chills, and shortness of breath. Tenderness over maxillary sinuses, no tenderness to frontal sinuses. He has not been on antibiotics recently. He has taken acetaminophen and Mucinex with some relief. He is a nonsmoker.   MDM: MARA. Lungs CTA  Plan: Amoxicillin-clavulanate 875-125mg BID for 7 days prescribed.    Treat symptoms conservatively with acetaminophen and  ibuprofen (if applicable) for fevers, body aches, and headaches, guaifenesin and/or honey for cough. May use chest rubs for sore throat and congestion, hot and cold liquids may help decrease sore throat and help you feel better. Increase fluids. You may utilize pseudoephedrine for congestion.  Return to be reevaluated by ER/UC or your primary care provider if symptoms worsen, you develop breathing difficulties, or you do not improve in a reasonable time frame. It can take several days for a cough to resolve. It can take ten to fourteen days for upper respiratory symptoms to resolve.     These discharge instructions and medications were reviewed with patient and understanding verbalized.    New Prescriptions    AMOXICILLIN-CLAVULANATE (AUGMENTIN) 875-125 MG TABLET    Take 1 tablet by mouth 2 times daily for 7 days Take 1 tablet by mouth 2 times daily       Final diagnoses:   Acute maxillary sinusitis, recurrence not specified     Susan De León, Nurse Practitioner Student  [facility]     3/31/2022   [facility]     Salena Glynn, CNP  03/31/22 9420

## 2022-03-31 NOTE — ED TRIAGE NOTES
Dealing with sinus troubles. Believes it is now a sinus infection. Pt states he gets them every spring.

## 2022-12-15 ENCOUNTER — HOSPITAL ENCOUNTER (EMERGENCY)
Facility: HOSPITAL | Age: 32
Discharge: HOME | End: 2022-12-15
Attending: PHYSICIAN ASSISTANT | Admitting: PHYSICIAN ASSISTANT
Payer: COMMERCIAL

## 2022-12-15 VITALS
DIASTOLIC BLOOD PRESSURE: 80 MMHG | TEMPERATURE: 101.6 F | RESPIRATION RATE: 18 BRPM | SYSTOLIC BLOOD PRESSURE: 128 MMHG | OXYGEN SATURATION: 94 % | HEART RATE: 105 BPM

## 2022-12-15 DIAGNOSIS — J10.1 INFLUENZA A: ICD-10-CM

## 2022-12-15 DIAGNOSIS — J32.1 FRONTAL SINUSITIS: ICD-10-CM

## 2022-12-15 LAB
FLUAV RNA SPEC QL NAA+PROBE: POSITIVE
FLUBV RNA RESP QL NAA+PROBE: NEGATIVE
RSV RNA SPEC NAA+PROBE: NEGATIVE
SARS-COV-2 RNA RESP QL NAA+PROBE: NEGATIVE

## 2022-12-15 PROCEDURE — 99283 EMERGENCY DEPT VISIT LOW MDM: CPT | Mod: CS | Performed by: PHYSICIAN ASSISTANT

## 2022-12-15 PROCEDURE — 87637 SARSCOV2&INF A&B&RSV AMP PRB: CPT | Performed by: PHYSICIAN ASSISTANT

## 2022-12-15 PROCEDURE — C9803 HOPD COVID-19 SPEC COLLECT: HCPCS

## 2022-12-15 PROCEDURE — 250N000013 HC RX MED GY IP 250 OP 250 PS 637: Performed by: PHYSICIAN ASSISTANT

## 2022-12-15 PROCEDURE — 99283 EMERGENCY DEPT VISIT LOW MDM: CPT | Mod: CS

## 2022-12-15 RX ORDER — IBUPROFEN 800 MG/1
800 TABLET, FILM COATED ORAL ONCE
Status: COMPLETED | OUTPATIENT
Start: 2022-12-15 | End: 2022-12-15

## 2022-12-15 RX ADMIN — IBUPROFEN 800 MG: 800 TABLET, FILM COATED ORAL at 12:29

## 2022-12-15 ASSESSMENT — ENCOUNTER SYMPTOMS
ABDOMINAL PAIN: 0
COUGH: 1
DIZZINESS: 0
FATIGUE: 1
NAUSEA: 1
SINUS PAIN: 1
WEAKNESS: 1
VOMITING: 0
SINUS PRESSURE: 1
SHORTNESS OF BREATH: 1
LIGHT-HEADEDNESS: 0
SORE THROAT: 1
APPETITE CHANGE: 1
CHILLS: 1
FEVER: 1
BACK PAIN: 0
HEADACHES: 1
ACTIVITY CHANGE: 1

## 2022-12-15 ASSESSMENT — ACTIVITIES OF DAILY LIVING (ADL): ADLS_ACUITY_SCORE: 35

## 2022-12-15 NOTE — ED NOTES
Discharge instructions reviewed. Verbalized understanding. Denies questions or concerns. Ambulated out of ER independently with steady gait with significant other at side.

## 2022-12-15 NOTE — ED PROVIDER NOTES
History   Fever     The history is provided by the patient.     Carter Ervin is a 32 year old male who presented to the emergency department ambulatory along with significant other for evaluation of frontal sinus discomfort, congestion, sore throat, and cough.  Symptoms have been ongoing for approximate the last 10 or 11 days and seem to have improved slightly then worsened over the last 3 days.  No vomiting.  Decreased appetite.  No abdominal pain.  Is been taking Tylenol.  No history of immunosuppression.    Allergies:  Allergies   Allergen Reactions     Morphine Other (See Comments)     insomnia       Problem List:    There are no problems to display for this patient.       Past Medical History:    No past medical history on file.    Past Surgical History:    No past surgical history on file.    Family History:    No family history on file.    Social History:  Marital Status:  Single [1]        Medications:    amoxicillin-clavulanate (AUGMENTIN) 875-125 MG tablet  busPIRone (BUSPAR) 5 MG tablet  citalopram (CELEXA) 40 MG tablet  citalopram (CELEXA) 40 MG tablet  lisinopril (ZESTRIL) 10 MG tablet  lisinopril (ZESTRIL) 10 MG tablet  omeprazole (PRILOSEC) 20 MG DR capsule  traZODone (DESYREL) 50 MG tablet          Review of Systems   Constitutional: Positive for activity change, appetite change, chills, fatigue and fever.   HENT: Positive for congestion, sinus pressure, sinus pain and sore throat.    Respiratory: Positive for cough and shortness of breath.    Cardiovascular: Negative for chest pain.   Gastrointestinal: Positive for nausea. Negative for abdominal pain and vomiting.   Genitourinary: Negative.    Musculoskeletal: Negative for back pain.   Skin: Negative.    Allergic/Immunologic: Negative for immunocompromised state.   Neurological: Positive for weakness and headaches. Negative for dizziness and light-headedness.       Physical Exam   BP: 134/88  Pulse: (!) 128  Temp: (!) 103  F (39.4  C)  Resp:  22  SpO2: 93 %      Physical Exam  Vitals and nursing note reviewed.   Constitutional:       General: He is not in acute distress.     Appearance: Normal appearance. He is normal weight. He is ill-appearing. He is not toxic-appearing or diaphoretic.   Cardiovascular:      Rate and Rhythm: Normal rate and regular rhythm.      Pulses: Normal pulses.      Comments: No significant tachycardia on my exam.  Pulmonary:      Effort: Pulmonary effort is normal.      Breath sounds: Normal breath sounds.      Comments: Harsh nonproductive cough  Musculoskeletal:      Cervical back: Normal range of motion and neck supple.   Skin:     General: Skin is warm and dry.      Capillary Refill: Capillary refill takes less than 2 seconds.   Neurological:      General: No focal deficit present.      Mental Status: He is alert and oriented to person, place, and time.   Psychiatric:         Mood and Affect: Mood normal.         Behavior: Behavior normal.         ED Course                 Procedures              Critical Care time:  none               Results for orders placed or performed during the hospital encounter of 12/15/22 (from the past 24 hour(s))   Symptomatic Influenza A/B & SARS-CoV2 (COVID-19) Virus PCR Multiplex Nasopharyngeal    Specimen: Nasopharyngeal; Swab   Result Value Ref Range    Influenza A PCR Positive (A) Negative    Influenza B PCR Negative Negative    RSV PCR Negative Negative    SARS CoV2 PCR Negative Negative    Narrative    Testing was performed using the Xpert Xpress CoV2/Flu/RSV Assay on the [employer] GeneXpert Instrument. This test should be ordered for the detection of SARS-CoV-2 and influenza viruses in individuals who meet clinical and/or epidemiological criteria. Test performance is unknown in asymptomatic patients. This test is for in vitro diagnostic use under the FDA EUA for laboratories certified under CLIA to perform high or moderate complexity testing. This test has not been FDA cleared or approved.  A negative result does not rule out the presence of PCR inhibitors in the specimen or target RNA in concentration below the limit of detection for the assay. If only one viral target is positive but coinfection with multiple targets is suspected, the sample should be re-tested with another FDA cleared, approved, or authorized test, if coinfection would change clinical management. This test was validated by the [facility]. These laboratories are certified under the Clinical Laboratory Improvement Amendments of 1988 (CLIA-88) as qualified to perform high complexity laboratory testing.       Medications   ibuprofen (ADVIL/MOTRIN) tablet 800 mg (800 mg Oral Given 12/15/22 1229)       Assessments & Plan (with Medical Decision Making)   32-year-old male who is influenza A positive.  Patient has had ongoing symptoms including frontal sinus discomfort for approximately 10 days.  He did seem to improve and then is worsened over the last 72 hours.  Question of whether he had a viral acute sinusitis of the frontal area and developed influenza a over the last 3 days, or whether his frontal sinuses now have become a bacterial infection including the influenza A.  Long discussion.  After shared decision making we elected to treat the patient with 10 days of Augmentin.  He is very comfortable and agrees to the plan.  He has no history of immunosuppression.  I do not believe there is any reasonable indication for further laboratory testing or imaging in the emergency department at this time.  Low threshold to return.  Close follow-up next week in the clinic.  Ibuprofen Tylenol as needed.    This document was prepared using a combination of typing and voice generated software.  While every attempt was made for accuracy, spelling and grammatical errors may exist.    I have reviewed the nursing notes.    I have reviewed the findings, diagnosis, plan and need for follow up with the patient.       New Prescriptions     AMOXICILLIN-CLAVULANATE (AUGMENTIN) 875-125 MG TABLET    Take 1 tablet by mouth 2 times daily for 10 days       Final diagnoses:   Influenza A   Frontal sinusitis       12/15/2022   [facility]     Alexander Pugh PA-C  12/15/22 8300

## 2022-12-15 NOTE — ED NOTES
Ambulated into ER decon independently with steady gait with c/o fever, cough, congestion, sinus pain, sore throat, and body aches for a week and a half. Significant other states she was sick before patient but wasn't tested for anything. Patient reports he last took tylenol 1000 mg 3 hours ago and has not taken any ibuprofen today. Frequent harsh cough noted.

## 2022-12-15 NOTE — ED TRIAGE NOTES
Sick for week and half with URI last 3 days increased facial pressure, sore thrat cough ear pain.     Triage Assessment     Row Name 12/15/22 1202       Triage Assessment (Adult)    Airway WDL WDL       Respiratory WDL    Respiratory WDL cough    Cough Frequency frequent    Cough Type dry       Skin Circulation/Temperature WDL    Skin Circulation/Temperature WDL temperature       Cardiac WDL    Cardiac WDL WDL       Peripheral/Neurovascular WDL    Peripheral Neurovascular WDL WDL       Cognitive/Neuro/Behavioral WDL    Cognitive/Neuro/Behavioral WDL WDL

## 2022-12-15 NOTE — DISCHARGE INSTRUCTIONS
Start antibiotics as prescribed.    Rest and stay hydrated.    Return to this emergency department for any new or worsening symptoms.

## 2023-04-10 ENCOUNTER — HOSPITAL ENCOUNTER (EMERGENCY)
Facility: HOSPITAL | Age: 33
Discharge: HOME | End: 2023-04-10
Attending: PHYSICIAN ASSISTANT | Admitting: PHYSICIAN ASSISTANT
Payer: COMMERCIAL

## 2023-04-10 VITALS
HEART RATE: 90 BPM | RESPIRATION RATE: 14 BRPM | SYSTOLIC BLOOD PRESSURE: 136 MMHG | OXYGEN SATURATION: 97 % | DIASTOLIC BLOOD PRESSURE: 87 MMHG | TEMPERATURE: 97.7 F

## 2023-04-10 DIAGNOSIS — M65.939 TENOSYNOVITIS OF WRIST: ICD-10-CM

## 2023-04-10 DIAGNOSIS — L30.9 DERMATITIS: ICD-10-CM

## 2023-04-10 PROCEDURE — 271N000006 HC CAST/SPLINT FIBERGLASS

## 2023-04-10 PROCEDURE — 29125 APPL SHORT ARM SPLINT STATIC: CPT

## 2023-04-10 PROCEDURE — 29125 APPL SHORT ARM SPLINT STATIC: CPT | Performed by: PHYSICIAN ASSISTANT

## 2023-04-10 PROCEDURE — 999N000104 HC STATISTIC NO CHARGE

## 2023-04-10 RX ORDER — PREDNISONE 20 MG/1
TABLET ORAL
Qty: 10 TABLET | Refills: 0 | Status: SHIPPED | OUTPATIENT
Start: 2023-04-10 | End: 2023-08-21

## 2023-04-10 RX ORDER — KETOROLAC TROMETHAMINE 10 MG/1
10 TABLET, FILM COATED ORAL EVERY 6 HOURS PRN
Qty: 20 TABLET | Refills: 0 | Status: SHIPPED | OUTPATIENT
Start: 2023-04-10 | End: 2023-08-21

## 2023-04-10 RX ORDER — CLOBETASOL PROPIONATE 0.5 MG/G
CREAM TOPICAL 2 TIMES DAILY
Qty: 30 G | Refills: 0 | Status: SHIPPED | OUTPATIENT
Start: 2023-04-10 | End: 2024-06-06

## 2023-04-10 ASSESSMENT — ACTIVITIES OF DAILY LIVING (ADL): ADLS_ACUITY_SCORE: 33

## 2023-04-10 NOTE — ED TRIAGE NOTES
Pt presents with c/o left wrist pain and rash rt lower leg  Left wrist is braced, heard a pop two weeks ago, and the pain has just increased, brace has been on the whole 2 weeks. Did not come in, intermitting numbness/tingling. Pain is centralized on the inner bottom part of his wrist.    The rash on lower right leg has been there for 6 months, no feeling in the lower calf, unable to say if there is pain or itching,was given a cream about 6 months ago, steroid cream, states that it has gotten bigger in size over time.      Tylenol yesterday

## 2023-04-10 NOTE — ED PROVIDER NOTES
"  History     Chief Complaint   Patient presents with     Wrist Pain     C/o lt wrist pain x 2 weeks, notes heard a \"pop\" when he reached for something. Also, concerned about a rash on his rt lower leg     HPI  Carter Ervin is a 32 year old male who presents for left wrist pain. This happened 2 weeks ago. He was reaching for something and it popped. Notes pushing on it has a popping sensation.  Has been wearing a wrist brace since.  Using tylenol 2-3 times a day.  Nothing he aware of makes it better.  Occasionally numbness/tingling in the thumb and index finger.  No trauma.    Rash on the right lower leg.  Started 6 months ago. Was seen in October and tried a steroid cream for 2 weeks and has done anything since.  Notes chronic leg numbness from a surgery so unsure if it bothers him.      Allergies:  Allergies   Allergen Reactions     Morphine Other (See Comments)     insomnia       Problem List:    There are no problems to display for this patient.       Past Medical History:    No past medical history on file.    Past Surgical History:    No past surgical history on file.    Family History:    No family history on file.    Social History:  Marital Status:  Single [1]        Medications:    citalopram (CELEXA) 40 MG tablet  clobetasol (TEMOVATE) 0.05 % external cream  ketorolac (TORADOL) 10 MG tablet  lisinopril (ZESTRIL) 10 MG tablet  omeprazole (PRILOSEC) 20 MG DR capsule  predniSONE (DELTASONE) 20 MG tablet  traZODone (DESYREL) 50 MG tablet  citalopram (CELEXA) 40 MG tablet  lisinopril (ZESTRIL) 10 MG tablet          Review of Systems   All other systems reviewed and are negative.      Physical Exam   BP: 136/87  Pulse: 90  Temp: 97.7  F (36.5  C)  Resp: 14  SpO2: 97 %      Physical Exam  Musculoskeletal:      Comments: Motor strength 5 out of 5 sensation intact cap refill less than 2 seconds in the upper extremities bilaterally.  To the left wrist there is tenderness to palpation with movement of the thumb " particularly to flexion and abduction.  There is tenderness palpation of the volar laterally of the wrist.         ED Course        I have reviewed the epic chart, the nurses note and triage note. vital signs were reviewed.  Symptoms seem consistent with tenosynovitis.  I discussed with the patient putting him in a thumb spica splint and having [facility].  Unclear etiology of the skin has tried a medium potency steroid without any significant relief can try a high-protein steroid for short-term duration.  Discussed if not improving discontinue he needs to follow-up with dermatology.         [facility]    Splint Application    Date/Time: 4/10/2023 1:38 PM    Performed by: Suleman Kaur PA-C  Authorized by: Suleman Kaur PA-C    Risks, benefits and alternatives discussed.      PRE-PROCEDURE DETAILS     Sensation:  Normal    PROCEDURE DETAILS     Laterality:  Left    Location:  Wrist    Wrist:  L wrist    Splint type:  Thumb spica    Supplies:  Cotton padding and Ortho-Glass    POST PROCEDURE DETAILS     Pain:  Unchanged    Sensation:  Normal                  No results found for this or any previous visit (from the past 24 hour(s)).    Medications - No data to display    Assessments & Plan (with Medical Decision Making)     I have reviewed the nursing notes.    I have reviewed the findings, diagnosis, plan and need for follow up with the patient.  .        Discharge Medication List as of 4/10/2023  1:45 PM      START taking these medications    Details   clobetasol (TEMOVATE) 0.05 % external cream Apply topically 2 times dailyDisp-30 g, [id number]      ketorolac (TORADOL) 10 MG tablet Take 1 tablet (10 mg) by mouth every 6 hours as needed for moderate pain, Disp-20 tablet, R-0, E-Prescribe      predniSONE (DELTASONE) 20 MG tablet Take two tablets (= 40mg) each day for 5 (five) days, Disp-10 tablet, R-0, E-Prescribe             Final diagnoses:   Tenosynovitis of  wrist   Dermatitis       4/10/2023   [facility]     Suleman Kaur PA-C  04/10/23 1341       Suleman Kaur PA-C  04/17/23 1126

## 2023-04-10 NOTE — DISCHARGE INSTRUCTIONS
[facility] for follow[facility]up.  He can wear the wrist splint for comfort may take off to shower.  Take the Toradol for pain control.  Apply the steroid cream as discussed if no improvement after 7 to 10 days discontinue is likely not going to be effective.

## 2023-08-21 ENCOUNTER — HOSPITAL ENCOUNTER (EMERGENCY)
Facility: HOSPITAL | Age: 33
Discharge: HOME | End: 2023-08-21
Attending: NURSE PRACTITIONER | Admitting: NURSE PRACTITIONER
Payer: COMMERCIAL

## 2023-08-21 VITALS
RESPIRATION RATE: 18 BRPM | DIASTOLIC BLOOD PRESSURE: 93 MMHG | TEMPERATURE: 98.9 F | SYSTOLIC BLOOD PRESSURE: 151 MMHG | HEART RATE: 104 BPM | OXYGEN SATURATION: 99 %

## 2023-08-21 DIAGNOSIS — S13.9XXA NECK SPRAIN, INITIAL ENCOUNTER: ICD-10-CM

## 2023-08-21 PROCEDURE — 96372 THER/PROPH/DIAG INJ SC/IM: CPT | Performed by: NURSE PRACTITIONER

## 2023-08-21 PROCEDURE — 250N000011 HC RX IP 250 OP 636: Mod: JZ | Performed by: NURSE PRACTITIONER

## 2023-08-21 PROCEDURE — G0463 HOSPITAL OUTPT CLINIC VISIT: HCPCS | Mod: 25

## 2023-08-21 PROCEDURE — 99213 OFFICE O/P EST LOW 20 MIN: CPT | Performed by: NURSE PRACTITIONER

## 2023-08-21 RX ORDER — CYCLOBENZAPRINE HCL 10 MG
10 TABLET ORAL 3 TIMES DAILY PRN
Qty: 18 TABLET | Refills: 0 | Status: SHIPPED | OUTPATIENT
Start: 2023-08-21 | End: 2023-08-27

## 2023-08-21 RX ORDER — KETOROLAC TROMETHAMINE 30 MG/ML
30 INJECTION, SOLUTION INTRAMUSCULAR; INTRAVENOUS ONCE
Status: COMPLETED | OUTPATIENT
Start: 2023-08-21 | End: 2023-08-21

## 2023-08-21 RX ADMIN — KETOROLAC TROMETHAMINE 30 MG: 30 INJECTION, SOLUTION INTRAMUSCULAR; INTRAVENOUS at 14:02

## 2023-08-21 ASSESSMENT — ENCOUNTER SYMPTOMS
NAUSEA: 0
NUMBNESS: 0
LIGHT-HEADEDNESS: 0
ACTIVITY CHANGE: 1
NECK PAIN: 1
HEADACHES: 0
EYES NEGATIVE: 1
CHILLS: 0
VOMITING: 0
SHORTNESS OF BREATH: 0
NECK STIFFNESS: 1
FEVER: 0
DIZZINESS: 0

## 2023-08-21 NOTE — ED TRIAGE NOTES
Pt states that 2 weeks ago he had a seizure, since that time he has had intermittent pain in the right side of his neck that radiates into his collar bone.  He states that it is tender to the touch and hurts when he moves his head.

## 2023-08-21 NOTE — ED TRIAGE NOTES
Patient presents to urgent care for neck pain x2 weeks. Patient had a seizure two weeks ago and since than he has had intermittent pain on the right side of his neck that radiates into his collar bone when he moves his neck the wrong way. Patient has been taking Aleve. Last dose was taken this morning.     Triage Assessment       Row Name 08/21/23 1327       Triage Assessment (Adult)    Airway WDL WDL       Respiratory WDL    Respiratory WDL WDL       Skin Circulation/Temperature WDL    Skin Circulation/Temperature WDL WDL       Cardiac WDL    Cardiac WDL WDL       Peripheral/Neurovascular WDL    Peripheral Neurovascular WDL WDL       Cognitive/Neuro/Behavioral WDL    Cognitive/Neuro/Behavioral WDL WDL

## 2023-08-21 NOTE — ED PROVIDER NOTES
"  History     Chief Complaint   Patient presents with    Neck Pain     HPI  Carter Ervin is a 33 year old male who presents with a 2-week history of right-sided neck pain that started after he had a seizure and fell and hit his head.  Seizure was not witnessed.  Was evaluated in ER postseizure.  Took Aleve this morning that did not help decrease his discomfort.  Has had episodes of previous muscular neck pain but \"never like this.\"  Chews nose.  Denies numbness and tingling in bilateral arms.  His fevers, chills, nausea, vomiting, shortness of breath, headaches, dizziness, and lightheadedness.    Neck Pain    Duration: two weeks   Description:  Location:  right side   Radiation: into the right neck  Intensity:  8/10 shooting pain  Accompanying signs and symptoms: none  History (similar episodes/previous evaluation):  muscular pain. Never pain like this  Precipitating or alleviating factors: seizure two weeks ago hit head  Therapies tried and outcome: Aleve, last dose this morning. Did not help     Allergies:  Allergies   Allergen Reactions    Morphine Other (See Comments)     insomnia       Problem List:    There are no problems to display for this patient.       Past Medical History:    History reviewed. No pertinent past medical history.    Past Surgical History:    History reviewed. No pertinent surgical history.    Family History:    History reviewed. No pertinent family history.    Social History:  Marital Status:  Single [1]        Medications:    citalopram (CELEXA) 40 MG tablet  clobetasol (TEMOVATE) 0.05 % external cream  cyclobenzaprine (FLEXERIL) 10 MG tablet  lisinopril (ZESTRIL) 10 MG tablet  omeprazole (PRILOSEC) 20 MG DR capsule  traZODone (DESYREL) 50 MG tablet          Review of Systems   Constitutional:  Positive for activity change. Negative for chills and fever.   Eyes: Negative.    Respiratory:  Negative for shortness of breath.    Gastrointestinal:  Negative for nausea and vomiting. "   Musculoskeletal:  Positive for neck pain and neck stiffness.   Neurological:  Negative for dizziness, light-headedness, numbness and headaches.       Physical Exam   BP: 151/93  Pulse: 104  Temp: 98.9  F (37.2  C)  Resp: 18  SpO2: 99 %      Physical Exam  Vitals and nursing note reviewed.   Constitutional:       General: He is in acute distress (Mild to moderate).      Appearance: He is normal weight.   HENT:      Head: Normocephalic.      Jaw: There is normal jaw occlusion.      Right Ear: Tympanic membrane and ear canal normal.      Left Ear: Tympanic membrane and ear canal normal.   Cardiovascular:      Rate and Rhythm: Tachycardia present.   Pulmonary:      Effort: Pulmonary effort is normal.   Musculoskeletal:      Right shoulder: No tenderness. Normal range of motion.      Left shoulder: No tenderness. Normal range of motion.      Cervical back: Normal range of motion. Tenderness present. No rigidity. Pain with movement and muscular tenderness present. No spinous process tenderness. Normal range of motion.      Comments: Pain with flexion against resistance, external rotation, and flexion and abduction of arms.  Range of motion within normal limits.   Skin:     Findings: No bruising or erythema.   Neurological:      Mental Status: He is alert and oriented to person, place, and time.      Motor: Motor function is intact. No weakness.   Psychiatric:         Behavior: Behavior normal.         ED Course                 Procedures             No results found for this or any previous visit (from the past 24 hour(s)).    Medications   ketorolac (TORADOL) injection 30 mg (30 mg Intramuscular $Given 8/21/23 5249)       Assessments & Plan (with Medical Decision Making)     I have reviewed the nursing notes.    I have reviewed the findings, diagnosis, plan and need for follow up with the patient.  (S13.9XXA) Neck sprain, initial encounter  Comment: 33 year old male who [place] with a 2-week history of right-sided neck  "pain that started after he had a seizure and fell and hit his head.  Seizure was not witnessed.  Was evaluated in ER postseizure.  Took Aleve this morning that did not help decrease his discomfort.  Has had episodes of previous muscular neck pain but \"never like this.\"  Chews nose.  Denies numbness and tingling in bilateral arms.  His fevers, chills, nausea, vomiting, shortness of breath, headaches, dizziness, and lightheadedness.    MDM: no cervical spine tenderness with palpation.    Ketorolac 30 mg given IM.    Plan: Flexeril 3 times daily as needed.  Education provided and/or discussed for this/these medication and cervical strain.  Ice to affected area 20 minutes every hour as needed for comfort. After 48 hours you can apply heat.  200  MG Naproxen as an initial dose. May Repeat 200 to 500 mg every six to 12 hours (maximum dose 1500 mg in 24 hours)Take with food. Tylenol 650 to 1000 mg every four to six hours as needed (not to exceed more than 4000 mg in a 24 hour period). May use interchangeably. Suggest medicating around the clock for the next 24-48 hours.  Follow up with primary provider or orthopedics as needed    Do not drive motor vehicles or operate heavy equipment while taking muscle relaxors.  These discharge instructions and medications were reviewed with him and understanding verbalized.    This document was prepared using a combination of typing and voice generated software.  While every attempt was made for accuracy, spelling and grammatical errors may exist.    Discharge Medication List as of 8/21/2023  2:00 PM        START taking these medications    Details   cyclobenzaprine (FLEXERIL) 10 MG tablet Take 1 tablet (10 mg) by mouth 3 times daily as needed for muscle spasms, Disp-18 tablet, R-0, E-Prescribe             Final diagnoses:   Neck sprain, initial encounter       8/21/2023   [facility]         Salena Glynn, CNP  08/21/23 1415    "

## 2024-03-11 ENCOUNTER — HOSPITAL ENCOUNTER (EMERGENCY)
Facility: HOSPITAL | Age: 34
Discharge: HOME | End: 2024-03-11
Attending: STUDENT IN AN ORGANIZED HEALTH CARE EDUCATION/TRAINING PROGRAM | Admitting: STUDENT IN AN ORGANIZED HEALTH CARE EDUCATION/TRAINING PROGRAM
Payer: COMMERCIAL

## 2024-03-11 ENCOUNTER — APPOINTMENT (OUTPATIENT)
Dept: CT IMAGING | Facility: HOSPITAL | Age: 34
End: 2024-03-11
Attending: STUDENT IN AN ORGANIZED HEALTH CARE EDUCATION/TRAINING PROGRAM
Payer: COMMERCIAL

## 2024-03-11 VITALS
BODY MASS INDEX: 30.47 KG/M2 | RESPIRATION RATE: 18 BRPM | SYSTOLIC BLOOD PRESSURE: 143 MMHG | DIASTOLIC BLOOD PRESSURE: 99 MMHG | WEIGHT: 206.35 LBS | HEART RATE: 92 BPM | OXYGEN SATURATION: 97 % | TEMPERATURE: 98.4 F

## 2024-03-11 DIAGNOSIS — Z78.9 ALCOHOL USE: ICD-10-CM

## 2024-03-11 DIAGNOSIS — R56.9 SEIZURE (H): ICD-10-CM

## 2024-03-11 DIAGNOSIS — F41.9 ANXIETY: ICD-10-CM

## 2024-03-11 LAB
ALBUMIN SERPL BCG-MCNC: 4 G/DL (ref 3.5–5.2)
ALP SERPL-CCNC: 73 U/L (ref 40–150)
ALT SERPL W P-5'-P-CCNC: 59 U/L (ref 0–70)
ANION GAP SERPL CALCULATED.3IONS-SCNC: 13 MMOL/L (ref 7–15)
AST SERPL W P-5'-P-CCNC: 116 U/L (ref 0–45)
BASOPHILS # BLD AUTO: 0 10E3/UL (ref 0–0.2)
BASOPHILS NFR BLD AUTO: 1 %
BILIRUB SERPL-MCNC: 0.2 MG/DL
BUN SERPL-MCNC: 13.1 MG/DL (ref 6–20)
CALCIUM SERPL-MCNC: 8.1 MG/DL (ref 8.6–10)
CHLORIDE SERPL-SCNC: 96 MMOL/L (ref 98–107)
CREAT SERPL-MCNC: 1 MG/DL (ref 0.67–1.17)
DEPRECATED HCO3 PLAS-SCNC: 21 MMOL/L (ref 22–29)
EGFRCR SERPLBLD CKD-EPI 2021: >90 ML/MIN/1.73M2
EOSINOPHIL # BLD AUTO: 0 10E3/UL (ref 0–0.7)
EOSINOPHIL NFR BLD AUTO: 0 %
ERYTHROCYTE [DISTWIDTH] IN BLOOD BY AUTOMATED COUNT: 12.5 % (ref 10–15)
GLUCOSE SERPL-MCNC: 145 MG/DL (ref 70–99)
HCT VFR BLD AUTO: 36.2 % (ref 40–53)
HGB BLD-MCNC: 12.7 G/DL (ref 13.3–17.7)
HOLD SPECIMEN: NORMAL
IMM GRANULOCYTES # BLD: 0 10E3/UL
IMM GRANULOCYTES NFR BLD: 0 %
LYMPHOCYTES # BLD AUTO: 0.6 10E3/UL (ref 0–5.3)
LYMPHOCYTES NFR BLD AUTO: 8 %
MCH RBC QN AUTO: 31.2 PG (ref 26.5–33)
MCHC RBC AUTO-ENTMCNC: 35.1 G/DL (ref 31.5–36.5)
MCV RBC AUTO: 89 FL (ref 78–100)
MONOCYTES # BLD AUTO: 0.4 10E3/UL (ref 0–1.3)
MONOCYTES NFR BLD AUTO: 6 %
NEUTROPHILS # BLD AUTO: 5.7 10E3/UL (ref 1.6–8.3)
NEUTROPHILS NFR BLD AUTO: 85 %
NRBC # BLD AUTO: 0 10E3/UL
NRBC BLD AUTO-RTO: 0 /100
PLATELET # BLD AUTO: 173 10E3/UL (ref 150–450)
POTASSIUM SERPL-SCNC: 3.9 MMOL/L (ref 3.4–5.3)
PROT SERPL-MCNC: 6.9 G/DL (ref 6.4–8.3)
RBC # BLD AUTO: 4.07 10E6/UL (ref 4.4–5.9)
SODIUM SERPL-SCNC: 130 MMOL/L (ref 135–145)
WBC # BLD AUTO: 6.8 10E3/UL (ref 4–11)

## 2024-03-11 PROCEDURE — 99284 EMERGENCY DEPT VISIT MOD MDM: CPT | Performed by: STUDENT IN AN ORGANIZED HEALTH CARE EDUCATION/TRAINING PROGRAM

## 2024-03-11 PROCEDURE — 250N000013 HC RX MED GY IP 250 OP 250 PS 637: Performed by: STUDENT IN AN ORGANIZED HEALTH CARE EDUCATION/TRAINING PROGRAM

## 2024-03-11 PROCEDURE — 36415 COLL VENOUS BLD VENIPUNCTURE: CPT | Performed by: STUDENT IN AN ORGANIZED HEALTH CARE EDUCATION/TRAINING PROGRAM

## 2024-03-11 PROCEDURE — 99285 EMERGENCY DEPT VISIT HI MDM: CPT | Mod: 25

## 2024-03-11 PROCEDURE — 258N000003 HC RX IP 258 OP 636: Performed by: STUDENT IN AN ORGANIZED HEALTH CARE EDUCATION/TRAINING PROGRAM

## 2024-03-11 PROCEDURE — 96375 TX/PRO/DX INJ NEW DRUG ADDON: CPT

## 2024-03-11 PROCEDURE — 96365 THER/PROPH/DIAG IV INF INIT: CPT

## 2024-03-11 PROCEDURE — 82040 ASSAY OF SERUM ALBUMIN: CPT | Performed by: STUDENT IN AN ORGANIZED HEALTH CARE EDUCATION/TRAINING PROGRAM

## 2024-03-11 PROCEDURE — 70450 CT HEAD/BRAIN W/O DYE: CPT

## 2024-03-11 PROCEDURE — 250N000011 HC RX IP 250 OP 636: Performed by: STUDENT IN AN ORGANIZED HEALTH CARE EDUCATION/TRAINING PROGRAM

## 2024-03-11 PROCEDURE — 85025 COMPLETE CBC W/AUTO DIFF WBC: CPT | Performed by: STUDENT IN AN ORGANIZED HEALTH CARE EDUCATION/TRAINING PROGRAM

## 2024-03-11 PROCEDURE — 93010 ELECTROCARDIOGRAM REPORT: CPT | Performed by: INTERNAL MEDICINE

## 2024-03-11 PROCEDURE — 93005 ELECTROCARDIOGRAM TRACING: CPT

## 2024-03-11 RX ORDER — ACETAMINOPHEN 325 MG/1
650 TABLET ORAL ONCE
Status: COMPLETED | OUTPATIENT
Start: 2024-03-11 | End: 2024-03-11

## 2024-03-11 RX ORDER — DIAZEPAM 10 MG/2ML
10 INJECTION, SOLUTION INTRAMUSCULAR; INTRAVENOUS ONCE
Status: COMPLETED | OUTPATIENT
Start: 2024-03-11 | End: 2024-03-11

## 2024-03-11 RX ORDER — DIAZEPAM 5 MG
10 TABLET ORAL ONCE
Status: COMPLETED | OUTPATIENT
Start: 2024-03-11 | End: 2024-03-11

## 2024-03-11 RX ADMIN — DIAZEPAM 10 MG: 5 TABLET ORAL at 16:28

## 2024-03-11 RX ADMIN — LEVETIRACETAM 2000 MG: 100 INJECTION, SOLUTION INTRAVENOUS at 16:30

## 2024-03-11 RX ADMIN — DIAZEPAM 10 MG: 5 INJECTION, SOLUTION INTRAMUSCULAR; INTRAVENOUS at 15:51

## 2024-03-11 RX ADMIN — ACETAMINOPHEN 650 MG: 325 TABLET, FILM COATED ORAL at 16:29

## 2024-03-11 ASSESSMENT — ACTIVITIES OF DAILY LIVING (ADL)
ADLS_ACUITY_SCORE: 35

## 2024-03-11 ASSESSMENT — COLUMBIA-SUICIDE SEVERITY RATING SCALE - C-SSRS
1. IN THE PAST MONTH, HAVE YOU WISHED YOU WERE DEAD OR WISHED YOU COULD GO TO SLEEP AND NOT WAKE UP?: NO
2. HAVE YOU ACTUALLY HAD ANY THOUGHTS OF KILLING YOURSELF IN THE PAST MONTH?: NO
6. HAVE YOU EVER DONE ANYTHING, STARTED TO DO ANYTHING, OR PREPARED TO DO ANYTHING TO END YOUR LIFE?: NO

## 2024-03-11 NOTE — ED TRIAGE NOTES
Patient presents via EMS with c/o seizure. Patient was found down at work. Hard hat on. No head or neck pain at this time, Did bite tongue, c/o pain. Patient reports 1 other seizure earliuer this year. No seizure disorder. Patient does endorse daily ETOH use, 8 beers a day, no hard liquor, last drink 2230.

## 2024-03-11 NOTE — Clinical Note
Carter Ervin was seen and treated in our emergency department on 3/11/2024.  He may return to work on 03/11/2024.  Carter is seen in the emergency department.  Please excuse from work.  Of note, he had a seizure and should not be operating heavy machinery or be up in high locations where he would be at risk of fall.     If you have any questions or concerns, please don't hesitate to call.      Toro Huerta MD

## 2024-03-11 NOTE — DISCHARGE INSTRUCTIONS
You were seen today for a seizure. As we discussed  - if you want to stop drinking, you should try to taper slowly so not to avoid withdrawal. You can also go to detox in [place]- call them at [phone]  - Follow up with neurology and psychology  - Please return to the emergency department if you have severe worsening in your withdrawal symptoms  -You should not drive or operate heavy machinery for 3 months or until you are cleared by neurology.

## 2024-03-11 NOTE — ED PROVIDER NOTES
ED Provider Note      History     Chief Complaint   Patient presents with    Seizures        HPI    Carter Ervin is a 33 year old year old with history of alcohol use disorder, previous seizure in August 2023 here after seizure.  Patient was at work and had a witnessed seizure.  It caused him to fall and he did hit his head.  He also bit his tongue.  EMS reported significant postictal period.  Patient reports he does not remember anything today.  Does not remember ever having a seizure in the past.  Per chart review he was seen in the emergency department and admitted back in August with a seizure episode likely from alcohol withdrawal.  Patient states he typically drinks about 8 beers daily and last drink yesterday evening at 10 PM, this would be about 17 hours since last drink.  No change in his typical routine of drinking.  He typically does not drink in the morning.  Denies any feeling of shakiness.    A medically appropriate review of systems was performed with pertinent positives and negatives noted in the HPI, and all other systems negative.    Physical Exam   /99   Pulse 92   Temp 97.1  F (36.2  C) (Tympanic)   Resp 18   Wt 93.6 kg (206 lb 5.6 oz)   SpO2 97%   BMI 30.47 kg/m     Physical Exam  General: no acute distress.  HENT: MMM, no oropharyngeal lesions  Eyes: PERRL, normal sclerae  Neck: non-tender, supple  Cardio: Tachycardic extremities well perfused  Resp: Normal work of breathing, no accessory muscle use  Abdomen: non tender, non-distended, no rebound, no guarding  Neuro: alert and oriented.  Mild tremulousness in the extremities, mild tongue fasciculations  MSK: no deformities. Grossly normal ROM in extremities.   Integumentary/Skin: no rashes or lesions      Procedures, & Data      Procedures      Medical Decision Making and ED Course    Patient is a 33-year-old gentleman with significant alcohol use and previous seizure episode who presents after seizure.  This is most likely  alcohol withdrawal seizure although it is odd that he has not changed any drinking habits and had a seizure so soon after last drink.  He is exhibiting mild alcohol withdrawal symptoms with shakiness and tongue fasciculations and tachycardia.  On the other hand, it could be a seizure disorder as this is now the second episode.  Will treat with Valium, Keppra, IV fluids, and get an EKG with basic labs.  May require admission for ongoing alcohol withdrawal syndrome.  Will also get a CT given seizure and fall with head trauma.    ED Course as of 03/11/24 1804   Mon Mar 11, 2024   1555 EKG 12 lead  EKG with sinus tachycardia, no QT prolongation or QRS widening   1604 Upon reassessment, still with some tongue fasciculations and tachycardia so we will do oral Valium.   1634 Sodium(!): 130  Likely from alcohol use   1634 Calcium(!): 8.1   1634 Chloride(!): 96   1634 Glucose(!): 145   1634 AST(!): 116  Consistent with alcohol use hepatic injury   1634 ALT: 59   1635 CT Head w/o Contrast  No acute intracranial hemorrhage or mass   1635 Had long discussion with patient and family that this is likely an alcohol withdrawal seizure related to his drinking habits.  Also explained that he cannot completely stop drinking as he will have more severe withdrawal.  I did offer him detox resources and told him to call the [place] detox if he desires to go there.  I would also like him to follow-up with neurology for outpatient MRI and EEG in case there is a component of primary seizure disorder here.   1636   Regardless, he needs 3 months of seizure precautions. I did recommend he is not driving and does not operate heavy machinery   1735 Pulse: 96  Heart rate and tremulousness has improved with p.o. Valium.         I have reviewed the nursing notes. I have reviewed the findings, diagnosis, and plan with the patient.    Impression   Final diagnoses:   Seizure (H)   Anxiety   Alcohol use         Toro Huerta MD  March 11,  2024       Toro Huerta MD  03/11/24 1807

## 2024-03-12 LAB
ATRIAL RATE - MUSE: 101 BPM
DIASTOLIC BLOOD PRESSURE - MUSE: NORMAL MMHG
INTERPRETATION ECG - MUSE: NORMAL
P AXIS - MUSE: 28 DEGREES
PR INTERVAL - MUSE: 130 MS
QRS DURATION - MUSE: 104 MS
QT - MUSE: 366 MS
QTC - MUSE: 474 MS
R AXIS - MUSE: 13 DEGREES
SYSTOLIC BLOOD PRESSURE - MUSE: NORMAL MMHG
T AXIS - MUSE: 28 DEGREES
VENTRICULAR RATE- MUSE: 101 BPM

## 2024-04-04 ENCOUNTER — OFFICE VISIT (OUTPATIENT)
Dept: PSYCHOLOGY | Facility: OTHER | Age: 34
End: 2024-04-04
Attending: COUNSELOR
Payer: COMMERCIAL

## 2024-04-04 DIAGNOSIS — F41.9 ANXIETY AND DEPRESSION: Primary | ICD-10-CM

## 2024-04-04 DIAGNOSIS — F41.9 ANXIETY: ICD-10-CM

## 2024-04-04 DIAGNOSIS — F32.A ANXIETY AND DEPRESSION: Primary | ICD-10-CM

## 2024-04-04 DIAGNOSIS — Z78.9 ALCOHOL USE: ICD-10-CM

## 2024-04-04 PROCEDURE — 90837 PSYTX W PT 60 MINUTES: CPT | Performed by: COUNSELOR

## 2024-04-10 NOTE — PROGRESS NOTES
Progress Note    Client Name: Carter Ervin  Date: April 4, 2024         Service Type: Consult Note      Session Start Time: 0100  Session End Time: 0155      Session Length: 55   Attendees: Client attended alone       PHQ-9 :        No data to display               FAY-7 :         No data to display              .Carter Ervin presented as O x 4,coherent,relevant and pleasant. His memory.was intact .The pt exhibited no overt signs of psychotic processes. There was no report of symptoms indicating derailment of thought  such   A/V hallucinations.The pt denied S/H ideations and as a consequence  there was immediate no need  for a  safety plan.The patient's insight and judgement were within a range acceptable for safety.   His fund of information appears to be within a normal range.   . .    The theme of this session was trying to discern where issues around anxiety arise. The pt agrees to come back to explore his anxiety.                 DATA      Progress Since Last Session (Related to Symptoms / Goals / Homework):   Symptoms:  Anxiety about his anxiety    Homework:  New client     Current / Ongoing Stressors and Concerns:               Concerned about how hi     Treatment Objective(s) Addressed in This Session:   Establish  the issues     Intervention:   Problem Clarification          ASSESSMENT: Current Emotional / Mental Status (status of significant symptoms):   Risk status (Self / Other harm or suicidal ideation)   Client denies current fears or concerns for personal safety.   Client denies current or recent suicidal ideation or behaviors.   Client denies current or recent homicidal ideation or behaviors.   Client denies current or recent self injurious behavior or ideation.   Client denies other safety concerns.   Recommended that patient call 911 or go to the local ED should there be a change in any of these risk factors.     Appearance:   Appropriate    Eye  Contact:   Good    Psychomotor Behavior: Normal    Attitude:   Cooperative    Orientation:   All   Speech    Rate / Production: Normal     Volume:  Normal    Mood:    Anxious  Dysphoric   Affect:    Mood congruent   Thought Content:  Clear    Thought Form:  Coherent  Logical    Insight:    Good         Medication Compliance:   Yes     Changes in Health Issues:   None reported     Chemical Use Review:   Substance Use: Chemical use reviewed, no active concerns identified      Tobacco Use: The pt chews tobacco     Collateral Reports Completed:   Not Applicable    PLAN: (Client Tasks / Therapist Tasks / Other)    Complete a DA.    TOMAS Arana    .The author of this note documented a reason for not sharing it with the patient.

## 2024-04-20 ENCOUNTER — HEALTH MAINTENANCE LETTER (OUTPATIENT)
Age: 34
End: 2024-04-20

## 2024-04-22 ENCOUNTER — OFFICE VISIT (OUTPATIENT)
Dept: PSYCHOLOGY | Facility: OTHER | Age: 34
End: 2024-04-22
Attending: COUNSELOR
Payer: COMMERCIAL

## 2024-04-22 DIAGNOSIS — F41.9 ANXIETY AND DEPRESSION: Primary | ICD-10-CM

## 2024-04-22 DIAGNOSIS — F32.A ANXIETY AND DEPRESSION: Primary | ICD-10-CM

## 2024-04-22 PROCEDURE — 90791 PSYCH DIAGNOSTIC EVALUATION: CPT | Performed by: COUNSELOR

## 2024-04-23 NOTE — PROGRESS NOTES
"Diagnostic Assessment       [facility]  Behavioral Health Diagnostic Assessment    2024      Patient Name: Carter Ervin    Patient: Carter Ervin MRN: [id number] ACCOUNT NUMBER: [id number]  : 1990   Age: 33 year old   Sex: male     Phone:  Home number on file: [phone] (home)    Work number on file:  There is no work phone number on file.    Cell number on file:       Telephone Information:   Mobile [phone]        Other phone number:         Service Type: Individual        Session Start Time:  0300  Session End Time:0430   Session Length: 90    Attendees: Patient      May leave program related message?  Yes  Preferred Phone: Cell      Telephone Visit Statement:    Patient has given verbal consent for Telephone visit?  Yes      Yes, the patient has been informed that any other mental health professional providing mental health services to me will need access to this Diagnostic Assessment in order to develop a treatment plan and receive payment.         Identifying Information:  Carter Ervin is a 33 year old, White,  male. Carter attended the   alone.   Patient presented as  Anxious,  on edge, cooperative, but appearing fatigued    Reason for Referral: Carter was referred to Behavioral Health Services by [facility]hospital\". Carter reports the reason for referral at this time is determine behavioral health treatment options.  Further information for this assessment was obtained via [facility] records.    Patient's Statement of Presenting Concern & Functional impairments:  Carter stated that his symptoms have resulted in the following functional impairments: health maintenance, self-care, and social interactions    Current Stressors/Losses/Disappointments: relationship     Mental Health History:  Carter reports first onset of mental health symptoms years ago.  Carter was first diagnosed nanci.   Carter  is not currently receiving any mental " "health services.    Psychiatric Hospitalizations: no prior psychiatric. hospitalization  Carter denies a history of civil commitment.        Onset/Duration/Pattern of Symptoms noted above:     \"Constant worrying,no energy,anxiety,depression\"     Carter reports the following understanding of his diagnosis: \"depression and anxiety\".    Personal Safety:    Are you depressed or being treated for depression? yes   Have you ever thought about hurting yourself (SIB) now or in the past? no     Have you ever thought about suicide now or in the past? No     Do you have a gun, weapons or other means (including medications) to harm yourself available to you? Yes. Stored in locked cabinets   Have any of your family members or friends attempted or completed suicide? (If yes, Who, When, How) Yes,a brother committed suicide     Do you take chances with your safety?   no   Have you currently or in the past had trouble with physical aggression (If yes, describe)? no     Have you ever thought about killing someone else? No   Have you ever heard voices? no                                                                                                                                                                                                                                                                                                                                                                                                                                           Supports:   From whom do you receive support? (family/friends/agency) family, spouse, and therapist     How often do you have contact with them? daily     Do your support people want/need education/resources? no        Is there anything in your life (current or history) that is satisfying to you (include leisure interests/hobbies)?   yes      Hope/Belief System:  Do you think things can get better? yes     Rate how strongly you believe things can get better: "   (Scale 1-5; 1=no belief; 5=Very Strong Belief)    4   What would make it better?  3    What gives you hope?    Treatment for depression       Personal Safety Summary:  After gathering the above information, Carter  presents the following high risk factors for suicide: male and panic,extreme anxiety.  Carter denies current fears or concerns for personal safety.    Carter has the following Protective Factors: Sense of responsibility to family and Positive social support      Upon review of the patient interview and identification of high risk factors determine individualized safety strategies alternatives and treatment plan interventions. Patient denied any current/recent/lifetime history of suicidal ideation and/or behaviors.  No safety plan indicated at this time.      Chemical Health History:     Family History: Reports a history of substance abuse by mother and brothers      Substance: Hx of Use/Abuse: Last Use: Pattern of Use:   Alcohol Yes,since he was 20 one days ago daily   Cannabis      Street Drugs      Prescription Drugs      Other        Substance Use Disorder Treatment:  Past history of treatment none, any legal issues as a result of chemical use None    Carter is currently receiving the following services:  none .       CAGE-AID:  Have you ever felt you ought to cut down on your drinking or drug use?   Yes    Have people annoyed you by criticizing your drinking or drug use?   Yes    Have you ever felt bad or guilty about your drinking or drug use?   Yes    Have you ever had a drink or used drugs first thing in the morning to steady your nerves or to get rid of a hangover?  Yes    Do you feel these issues have been adequately addressed?   No. Are you ready to address them now?       Chemical Dependency Assessment Recommended?   Not at this time ,the pt will be encourage to address his substance abuse         Legal History:    Carter reports that he has not been involved with the legal system.     Life  Situation (Employment/School/Finances/Basic Needs):  Carter  is currently living with Spouse in a house.   The safety/stability of this environment is described as: Stable    Carter is currently employed full time and reports @HIS@ is able to function appropriately at work..  Carter describes a work Hx of working as an    Carter reports finances are obtained through Employment  Carter does not identify his finances as a current stressor.  Carter  denies a history of gambling and denies a history of gambling treatment.     Carter reports his highest level of education is high school graduate  Carter did not identify any learning problems   Carter describes academic performance as: good    Carter describes school social experience as: good     Carter denies concerns regarding his current ability to meet basic needs.       Social/Family History:  Carter  reports he grew up in [place], [place].  Carter was the one  of four children. He has two half brothers  Carter reports his biological parents are       Carter describes his childhood as good  Carter describes his current relationships with his family of origin as good.    Carter identifies his relationship status as: .      Carter identifies his sexual orientation as: opposite sex   Carter denies sexual health concerns.       Carter reports having no children.     Carter describes the quantity/quality of his social relationships as good but very limited  Carter denies personal  experience.       Carter reported the following biological family members or relatives with mental health issues: Mother experienced an Anxiety Disorder and Depression and Brother experienced suicide .    Significant Losses / Trauma / Abuse / Neglect Issues / Developmental Incidents:  Carter reports significant loss/trauma/abuse/neglect issues/developmental incidents Death of a brother by suicide  Carter reports death of brother by suicide    Carter has not  addressed the above concerns in previous therapy/treatment       Temple Preference/Spiritual Beliefs/Cultural Considerations:     None identified    A. Ethnic Self-Identification:  Carter self-identifies his race/ethnicities as:  and his preferred language to be English.   Carter reports he does not need the assistance of an . Carter  reports he does not need other support or modifications involved in therapy.        Strengths/Vulnerabilities:   Carter identifies his personal strengths as: caring, good listener, motivated, and work history .   Things that may interfere with the clients success in treatment include: few friends.   Other identified areas of vulnerability include: Anxiety with/without panic attacks  Active/history of addiction/substance abuse  Depressive symptoms.     Medical History / Physical Health Screen:     Primary Care Physician: Carter has a non-[facility]  PCP   Last Physical Exam: greater than a year ago and client was encouraged to schedule an exam with PCP.    Mental Health Medication Management Provider / Psychiatrist: Carter reports not having a psychiatrist.         Current medications including prescription, non-prescription, herbals, dietary aids and vitamins:  Per client report:     Current Outpatient Medications:      citalopram (CELEXA) 40 MG tablet, TAKE 2 TABLETS (80 MG) BY MOUTH ONCE DAILY., Disp: , Rfl:      clobetasol (TEMOVATE) 0.05 % external cream, Apply topically 2 times daily, Disp: 30 g, Rfl: 0     lisinopril (ZESTRIL) 10 MG tablet, TAKE 1 TABLET (10 MG) BY MOUTH ONCE DAILY., Disp: , Rfl:      omeprazole (PRILOSEC) 20 MG DR capsule, TAKE 1 CAPSULE (20 MG) BY MOUTH ONCE DAILY ON AN EMPTY STOMACH., Disp: , Rfl:      traZODone (DESYREL) 50 MG tablet, Take 50 mg by mouth At Bedtime, Disp: , Rfl:     Carter reports current medications are: Effective.   Carter describes taking his medications as: Independent.  Carter reports taking prescribed medications as  prescribed.        Medical:  No past medical history on file.    Surgical:  No past surgical history on file.  Allergy:   Carter reports   Allergies   Allergen Reactions     Morphine Other (See Comments)     insomnia        Family History of Medical, Mental Health and/or Substance Use problems:  Per client report: No family history on file.    Carter reports the following current medical concerns: Had a seizure,had a concussion .      General Health:  Have you had any exposure to any communicable disease in the past 2-3 weeks? no     Are you aware of safe sex practices? yes     Is there a possibility of pregnancy?  no       Nutrition:    Are you on a special diet? If yes, please explain:  no   Do you have any concerns regarding your nutritional status? If yes, please explain:  no   Have you had any appetite changes in the last 3 months?  No     Have you had any weight loss or weight gain in the last 3 months?  No     Do you have a history of an eating disorder? no   Do you have a history of being in an eating disorder program? no     Fall Risk:   Have you had any falls in the past 3 months? no     Do you currently useany assistive devices for mobility?   no     Per completion of the Medical History / Physical Health Screen, is there a recommendation to see / follow up with a primary care physician/clinic?    No.    Clinical Findings      Mental Status Assessment:    Appearance:   Appropriate   Eye Contact:   Fair   Psychomotor Behavior: Normal  Retarded (Slowed)   Attitude:   Cooperative   Orientation:   All  SpeechAppropriate    Rate / Production: Normal    Volume:  Normal   Mood:    Anxious  Depressed  Dysphoric  Affect:      Thought Content:  Clear   Thought Form:  Coherent  Logical   Insight:    Fair       Review of Symptoms:  Depression: No symptoms Sleep Interest Energy Concentration Psychomotor slowing or agitation  Aletha:  No symptoms  Psychosis: No symptoms  Anxiety: Worries Nervousness  Panic:  No  symptoms  Post Traumatic Stress Disorder: No symptoms  Obsessive Compulsive Disorder: No symptoms  Eating Disorder: No symptoms    Safety Issues and Plan for Safety and Risk Management:  Patient denies a history of suicidal ideation, suicide attempts, self-injurious behavior, homicidal ideation, homicidal behavior, and and other safety concerns  Patient denies current fears or concerns for personal safety.  Patient denies current or recent suicidal ideation or behaviors.  Patient denies current or recent homicidal ideation or behaviors.  Patient denies current or recent self injurious behavior or ideation.  Patient denies other safety concerns.  Patient reports there are firearms in the house. The firearms are secured in a locked space  Recommended that patient call 911 or go to the local ED should there be a change in any of these risk factors.      Diagnostic Criteria:  Persistent Depressive Disorder  A. Depressed mood for most of the day, for more days than not, as indicated either by subjective account or observation by others, for at least 2 years. Note: In children and adolescents, mood can be irritable and duration must be at least 1 year.   B. Presence, while depressed, of two (or more) of the following:        - insomnia or hypersomnia       - low energy or fatigue   C. During the 2-year period (1 year for children or adolescents) of the disturbance, the person has never been without the symptoms in Criteria A and B for more than 2 months at a time.  D. Criteria for a major depressive disorder may be continously present for 2 years  E. There has never been a Manic Episode, a Mixed Episode, or a Hypomanic Episode, and criteria have never been met for Cyclothymic Disorder.   F. The disturbance is not better explained by a persistent schizoaffective disorder, schizophrenia, delusional disorder, or other specified or unspecified schizophrenia spectrum and other psychotic disorder  G. The symptoms are not  "attributable to the physiological effects of a substance (e.g., a drug of abuse, a medication) or another medical condition (e.g., hypothyroidism).   H. The symptoms cause clinically significant distress or impairment in social, occupational, or other important areas of functioning.        - Late Onset: if onset is age 21 years or older     DSM5 Diagnoses: (Sustained by DSM5 Criteria Listed Above)  Behavioral and Medical Diagnosis: 300.4 (F34.1) Persistent Depressive Disorder, Moderate;    Psychosocial & Contextual Factors: mental health symptoms    The patient  MAY utilize the Alpha Stimulator therapy.   Patient Does not have a pacemaker.     Medical Concerns that may Impact Treatment:      History of a concussion and seizure    WHODAS 2.0 SCORE:        No data to display                  PHQ-9:        No data to display                  Clinical Findings/Summary: (include recommendations, prioritization of needs, ancillary services, client and family participation in preferences, and referrals made)    Identifying Information:    Carter Ervin is a 33 year old, White,  male. Carter attended the [facility]  alone.   Patient presented as  Anxious,  on edge, cooperative, but appearing fatigued. Carter was referred to Behavioral Health Services by [facility]". Carter reports the reason for referral at this time is determine behavioral health treatment options.  Further information for this assessment was obtained via [facility] records. Carter stated that his symptoms have resulted in the following functional impairments: health maintenance, self-care, and social interactions    Carter  reports he grew up in [place], [place].He was the one  of four children. He has two half brothers  He reports his biological parents are  .Carter describes his childhood as good. His current relationships with his family of origin as good.    Carter identifies his relationship status as:heterosexual " and  .. Carter denies sexual health concerns.He has no children. Carter describes the quantity/quality of his social relationships as good but very limited.Carter denies personal  experience.     Carter  is currently living with Spouse in a house. The safety/stability of this environment is described as: Stable.Carter is currently employed full time and reports @HIS@ is able to function appropriately at work.Carter describes a work Hx of working as an  Carter reports finances are obtained through Employment  Carter does not identify his finances as a current stressor.  Carter  denies a history of gambling and denies a history of gambling treatment.     Carter reports his highest level of education is high school graduate  Carter did not identify any learning problems Carter describes academic performance as: good    Carter reported the following biological family members or relatives with mental health issues: Mother experienced an Anxiety Disorder and Depression and Brother experienced suicide.He ,his brothers and his mother has problems with alcohol.    The pt presents with:    Persistent Depressive Disorder  A. Depressed mood for most of the day, for more days than not, as indicated either by subjective account or observation by others, for at least 2 years. Note: In children and adolescents, mood can be irritable and duration must be at least 1 year.   B. Presence, while depressed, of two (or more) of the following:        - insomnia or hypersomnia       - low energy or fatigue   C. During the 2-year period (1 year for children or adolescents) of the disturbance, the person has never been without the symptoms in Criteria A and B for more than 2 months at a time.  D. Criteria for a major depressive disorder may be continously present for 2 years  E. There has never been a Manic Episode, a Mixed Episode, or a Hypomanic Episode, and criteria have never been met for Cyclothymic Disorder.    F. The disturbance is not better explained by a persistent schizoaffective disorder, schizophrenia, delusional disorder, or other specified or unspecified schizophrenia spectrum and other psychotic disorder  G. The symptoms are not attributable to the physiological effects of a substance (e.g., a drug of abuse, a medication) or another medical condition (e.g., hypothyroidism).   H. The symptoms cause clinically significant distress or impairment in social, occupational, or other important areas of functioning.        - Late Onset: if onset is age 21 years or older     He is seeking treatment from [facility] to address his depression and anxiety            Serious and Persistent Mental Illness Eligibility Determination:  Eligibility Determination(Age 18+):  MI - Mental Illness: An organic disorder of the brain or a clinically significant disorder of thought, mood, perception, orientation, memory, or behavior that:   is listed in the ICD-9 CM, code range 290.0 or 306.0-316.0     This adult meets criteria for MI : (F41.9,  F32.A) Anxiety and depression  (primary encounter diagnosis)           Serious and Persistent Mental Illness (SPMI)(Age 18+): A condition with a diagnosis of mental illness that meets at least one of the following:    [] The recipient had two or more episodes of inpatient care for mental illness within the past 24 months  [] The recipient had continuous psychiatric hospitalization or residential treatment exceeding six months duration within the preceding 12 months   [] The recipient has been treated by a crisis team two or more times within the preceding 24 months   [] The recipient has a diagnosis of schizophrenia, bipolar disorder, major depression or borderline personality disorder, evidences a significant impairment in functioning, and has a   written opinion from a mental health professional stating he/she is likely to have future episodes requiring inpatient or residential treatment  unless community support program services are provided  []  The recipient has, in the last three years, been committed by a court as a mentally ill person under [place] statutes, or the adult's commitment as a mentally ill person has been stayed or continued  [] The recipient was eligible under one of the above criteria, but the specified time period has      It is my professional opinion that patient:     1. Has symptoms of mental illness that impair function in the following areas:       Mental health symptoms, Interpersonal skills, and Use of drugs or alcohol     2. Rehabilitative mental health services would reduce symptoms to allow regulated, restored or improved functioning.  Maintain stability, function, preventing risk of significant functional decompensation or more restrictive service setting.      3. Has the cognitive capacity to benefit from rehabilitative mental health techniques and methods.    Referral to another professional/service is not indicated at this time..  ARM (Adult Rehabilitative Mental Health Services) to rehabilitate the areas of functional impairments.    A Release of Information is not needed at this time.              E. Client agrees to:  Partner with the treatment team to identify, prioritize and work on goals.         I certify that Behavioral Health services are medically necessary to improve or maintain the client's condition and functional level and to prevent relapse or hospitalization.  Behavioral health services will be provided in lieu of psychiatric hospitalization, no less intensive level of care would be sufficient to provide the medically necessary treatment the client requires.     Due to the clinical severity of the symptoms reported by the client, functional impairments exist that significantly disrupt functioning.  The client reports these symptoms negatively impact their quality of life.  Without the recommended medically necessary treatments listed, the  client's symptoms are likely to increase in severity and functioning may further decline.  If the client participates and complies with recommended treatment, the prognosis if fair.      Yes, the patient has been informed that any other mental health professional providing mental health services to me will need access to this Diagnostic Assessment in order to develop a treatment plan and receive payment.    Juan Ramon Brantley, [facility]       The author of this note documented a reason for not sharing it with the patient.

## 2024-05-01 ASSESSMENT — ANXIETY QUESTIONNAIRES
IF YOU CHECKED OFF ANY PROBLEMS ON THIS QUESTIONNAIRE, HOW DIFFICULT HAVE THESE PROBLEMS MADE IT FOR YOU TO DO YOUR WORK, TAKE CARE OF THINGS AT HOME, OR GET ALONG WITH OTHER PEOPLE: SOMEWHAT DIFFICULT
3. WORRYING TOO MUCH ABOUT DIFFERENT THINGS: NEARLY EVERY DAY
7. FEELING AFRAID AS IF SOMETHING AWFUL MIGHT HAPPEN: MORE THAN HALF THE DAYS
1. FEELING NERVOUS, ANXIOUS, OR ON EDGE: MORE THAN HALF THE DAYS
6. BECOMING EASILY ANNOYED OR IRRITABLE: NEARLY EVERY DAY
GAD7 TOTAL SCORE: 17
3. WORRYING TOO MUCH ABOUT DIFFERENT THINGS: NEARLY EVERY DAY
GAD7 TOTAL SCORE: 17
5. BEING SO RESTLESS THAT IT IS HARD TO SIT STILL: MORE THAN HALF THE DAYS
GAD7 TOTAL SCORE: 16
5. BEING SO RESTLESS THAT IT IS HARD TO SIT STILL: SEVERAL DAYS
6. BECOMING EASILY ANNOYED OR IRRITABLE: MORE THAN HALF THE DAYS
7. FEELING AFRAID AS IF SOMETHING AWFUL MIGHT HAPPEN: NOT AT ALL
2. NOT BEING ABLE TO STOP OR CONTROL WORRYING: NEARLY EVERY DAY
2. NOT BEING ABLE TO STOP OR CONTROL WORRYING: NEARLY EVERY DAY
1. FEELING NERVOUS, ANXIOUS, OR ON EDGE: NEARLY EVERY DAY

## 2024-05-01 ASSESSMENT — PATIENT HEALTH QUESTIONNAIRE - PHQ9
5. POOR APPETITE OR OVEREATING: NEARLY EVERY DAY
SUM OF ALL RESPONSES TO PHQ QUESTIONS 1-9: 16
5. POOR APPETITE OR OVEREATING: NEARLY EVERY DAY

## 2024-05-20 ENCOUNTER — OFFICE VISIT (OUTPATIENT)
Dept: PSYCHOLOGY | Facility: OTHER | Age: 34
End: 2024-05-20
Attending: COUNSELOR
Payer: COMMERCIAL

## 2024-05-20 DIAGNOSIS — F41.9 ANXIETY AND DEPRESSION: Primary | ICD-10-CM

## 2024-05-20 DIAGNOSIS — F32.A ANXIETY AND DEPRESSION: Primary | ICD-10-CM

## 2024-05-20 PROCEDURE — 90837 PSYTX W PT 60 MINUTES: CPT | Performed by: COUNSELOR

## 2024-05-23 ASSESSMENT — ANXIETY QUESTIONNAIRES
GAD7 TOTAL SCORE: 12
7. FEELING AFRAID AS IF SOMETHING AWFUL MIGHT HAPPEN: NOT AT ALL
5. BEING SO RESTLESS THAT IT IS HARD TO SIT STILL: MORE THAN HALF THE DAYS
1. FEELING NERVOUS, ANXIOUS, OR ON EDGE: MORE THAN HALF THE DAYS
6. BECOMING EASILY ANNOYED OR IRRITABLE: MORE THAN HALF THE DAYS
GAD7 TOTAL SCORE: 12
2. NOT BEING ABLE TO STOP OR CONTROL WORRYING: MORE THAN HALF THE DAYS
3. WORRYING TOO MUCH ABOUT DIFFERENT THINGS: MORE THAN HALF THE DAYS

## 2024-05-23 ASSESSMENT — PATIENT HEALTH QUESTIONNAIRE - PHQ9
5. POOR APPETITE OR OVEREATING: MORE THAN HALF THE DAYS
SUM OF ALL RESPONSES TO PHQ QUESTIONS 1-9: 11

## 2024-05-23 NOTE — PROGRESS NOTES
Progress Note    Client Name: Carter Ervin  Date: May 20, 2024         Service Type: Individual      Session Start Time: 0330  Session End Time: 0425      Session Length: 55          Attendees: Client attended alone      PHQ-9 :       5/1/2024     8:46 AM 5/1/2024     8:48 AM   PHQ-9 SCORE   PHQ-9 Total Score 12 16      FAY-7 :        5/1/2024     8:46 AM 5/1/2024     8:48 AM   FAY-7 SCORE   Total Score 16 17           DATA     Carter Ervin presented as O x 4,coherent,relevant ,mildly distressed but pleasant. memory.was intact .The pt exhibited no overt signs of psychotic processes. There was no report of symptoms indicating derailment of thought  such   A/V hallucinations.The pt denied S/H ideations and as a consequence  there was immediate no need  for a  safety plan.The patient's insight and judgement were within a range acceptable for safety.  His fund of information appears to be within a normal range.     Carter reported that he has been laid off work for awhile.,He says that he is sleeping maybe too much. He also that he sometimes drinks up to a six  pack of Coors lite in order to help his anxiety. He says he wants to be social but he just can't be at times.He said that drinking alcohol sometimes allows him to be more social.    Carter said that he attended college at [employer]. 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 He then transferred to [employer] but didn't do well . He dropped  out because his anxiety and possibly anxiety got in the way.    He expressed a desire to have a better life,          Progress Since Last Session (Related to Symptoms / Goals / Homework):   Symptoms: No change      Homework: Completed in session     Current / Ongoing Stressors and Concerns:   Depression.on a lay off     Treatment Objective(s) Addressed in This Session:   Improved mood; alleviate  anxiety     Intervention:   Psychoeducation     Response to Interventions:   Attends well;wants to feel better     ASSESSMENT: Current Emotional / Mental Status (status of significant symptoms):   Risk status (Self / Other harm or suicidal ideation)   Client denies current fears or concerns for personal safety.   Client denies current or recent suicidal ideation or behaviors.   Client denies current or recent homicidal ideation or behaviors.   Client denies current or recent self injurious behavior or ideation.   Client denies other safety concerns.   Recommended that patient call 911 or go to the local ED should there be a change in any of these risk factors.     Appearance:   Appropriate    Eye Contact:   Fair    Psychomotor Behavior: Retarded (Slowed)    Attitude:   Cooperative    Orientation:   All   Speech    Rate / Production: Normal     Volume:  Normal    Mood:    Anxious  Dysphoric   Affect:     Mood congruent   Thought Content:  Clear    Thought Form:  Coherent  Logical    Insight:    Fair         Medication Compliance:   Yes     Changes in Health Issues:   None reported     Chemical Use Review:   Substance Use: Drinks beer daily     Tobacco Use: chews tobacco     Collateral Reports Completed:   Not Applicable    PLAN: (Client Tasks / Therapist Tasks / Other)    Work with the pt develop strategies to manage his anxiety    TOMAS Arana

## 2024-06-03 ENCOUNTER — OFFICE VISIT (OUTPATIENT)
Dept: PSYCHOLOGY | Facility: OTHER | Age: 34
End: 2024-06-03
Attending: COUNSELOR
Payer: COMMERCIAL

## 2024-06-03 DIAGNOSIS — F32.A ANXIETY AND DEPRESSION: Primary | ICD-10-CM

## 2024-06-03 DIAGNOSIS — F41.9 ANXIETY AND DEPRESSION: Primary | ICD-10-CM

## 2024-06-03 PROCEDURE — 90834 PSYTX W PT 45 MINUTES: CPT | Performed by: COUNSELOR

## 2024-06-04 NOTE — PROGRESS NOTES
Progress Note    Client Name: Carter Ervin  Date: Shantal 3, 2024         Service Type: Individual      Session Start Time: 0330  Session End Time: 0420      Session Length: 50         Attendees: Client attended alone       PHQ-9 :       5/1/2024     8:46 AM 5/1/2024     8:48 AM 5/23/2024     9:54 AM   PHQ-9 SCORE   PHQ-9 Total Score 12 16 11      FAY-7 :        5/1/2024     8:46 AM 5/1/2024     8:48 AM 5/23/2024     9:54 AM   FAY-7 SCORE   Total Score 16 17 12           DATA     Carter Ervin presented as O x 4,coherent,relevant and pleasant. memory.was intact .The pt exhibited no overt signs of psychotic processes. There was no report of symptoms indicating derailment of thought  such   A/V hallucinations.The pt denied S/H ideations and as a consequence  there was immediate no need  for a  safety plan.The patient's insight and judgement were within a range acceptable for safety.   fund of information appears to be within a normal range.   sleep disturbance nor disturbance in appetite. .    The theme of this session was Carter's interest. The pt appears to have limited interest. He says he spends a great deal with his father who he regards as his best friend.He fishes and hunt with him. He is excited to get some land in [place]. Will see a neurologist in order to  determine the source a seizure trhat he had, Waiting to return to work as an          Progress Since Last Session (Related to Symptoms / Goals / Homework):   Symptoms: Improving      Homework: Completed in session     Current / Ongoing Stressors and Concerns:   Depression,fatigue,moving to a new house     Treatment Objective(s) Addressed in This Session:   Improved mood     Intervention:   Psychoeducation     Response to Interventions:   Attended well     ASSESSMENT: Current Emotional / Mental Status (status of significant symptoms):   Risk status (Self / Other harm or suicidal  ideation)   Client denies current fears or concerns for personal safety.   Client denies current or recent suicidal ideation or behaviors.   Client denies current or recent homicidal ideation or behaviors.   Client denies current or recent self injurious behavior or ideation.   Client denies other safety concerns.   Recommended that patient call 911 or go to the local ED should there be a change in any of these risk factors.     Appearance:   Appropriate    Eye Contact:   Fair   Psychomotor Behavior: Normal    Attitude:   Cooperative    Orientation:   All   Speech    Rate / Production: Normal     Volume:  Soft    Mood:    Anxious  Dysphoric   Affect:    Sylvia congruent   Thought Content:  Clear    Thought Form:  Coherent  Logical    Insight:    Fair         Medication Compliance:   Yes     Changes in Health Issues:   Yes: Carter has upcoming neurological appointment     Chemical Use Review:   Substance Use: Chemical use reviewed, no active concerns identified ( drinks beer to self-medicate, needs further discussion)     Tobacco Use: Uses smokeless tobacco     Collateral Reports Completed:   Not Applicable    PLAN: (Client Tasks / Therapist Tasks / Other)  Encourage the pt verbal spontaneity    TOMAS Arana

## 2024-06-06 ENCOUNTER — OFFICE VISIT (OUTPATIENT)
Dept: NEUROLOGY | Facility: CLINIC | Age: 34
End: 2024-06-06
Attending: STUDENT IN AN ORGANIZED HEALTH CARE EDUCATION/TRAINING PROGRAM
Payer: COMMERCIAL

## 2024-06-06 VITALS
TEMPERATURE: 97.9 F | WEIGHT: 188.4 LBS | SYSTOLIC BLOOD PRESSURE: 118 MMHG | BODY MASS INDEX: 27.91 KG/M2 | OXYGEN SATURATION: 98 % | HEIGHT: 69 IN | HEART RATE: 114 BPM | DIASTOLIC BLOOD PRESSURE: 84 MMHG

## 2024-06-06 DIAGNOSIS — G40.909 SEIZURE DISORDER (H): Primary | ICD-10-CM

## 2024-06-06 DIAGNOSIS — G40.909 SEIZURE DISORDER (H): ICD-10-CM

## 2024-06-06 NOTE — PROGRESS NOTES
June 6, 2024    CHIEF COMPLAINT:  Seizures.    HISTORY OF PRESENT ILLNESS:  This patient is a 33 year old years old right handed male presented for evaluation for seizure like activities.  He needs to be cleared for work. Patient is by himself in the clinic today.    Patient had history of Moderate episode of recurrent major depressive disorder, Anxiety disorder, Essential hypertension and Lord's esophagus without dysplasia. In March 2024, he was at work, and was found unresponsive and confused at 3pm. He was found on the ground. No one witnessed how he went down to the ground. He woke up and was not coherent. He had no clear postictal confusion or fatigue. He did not remember anything about the spell. No clear triggers. He had tongue    He did not eat much for about 2 days. He usually drinks 10-11 beers daily. He probably had about 8 beers the day before the ED, and had no drink the day of the ED.    He was sent to ED. He had a head CT which was normal. All other tests were reported normal, although he can not remember what tests were done.    He has another similar event in Aug. 2023. He was sent to the ED. This was considered an alcohol withdrawal seizure.    TRIGGERS FOR SEIZURES:    ETOH withdrawal?    RISK FACTORS FOR SEIZURES:  No history of head trauma with loss of consciousness, no history of CNS infection, no history of febrile convulsions.  No history of brain tumor or stroke. Normal development.     ALLERGIES:  Morphine    CURRENT MEDICATIONS:    Current Outpatient Medications   Medication Sig Dispense Refill    citalopram (CELEXA) 40 MG tablet TAKE 2 TABLETS (80 MG) BY MOUTH ONCE DAILY.      lisinopril (ZESTRIL) 10 MG tablet TAKE 1 TABLET (10 MG) BY MOUTH ONCE DAILY.      omeprazole (PRILOSEC) 20 MG DR capsule TAKE 1 CAPSULE (20 MG) BY MOUTH ONCE DAILY ON AN EMPTY STOMACH.       PAST AEDs:  None    PAST MEDICAL HISTORY:  Depression  Anxiety  Acid reflux  HTN    PAST SURGICAL HISTORY:  Knee surgery  "reconstructive 2009  Finger graft/seperatioin    Family History:  History of seizures:   None    SOCIAL HISTORY:  Born and raised: [place]   Education: High school, regular classes  Work:   Tobacco: None, ETOH: 10-11 beers a day, Drugs: Occasional Marijuana    Family: , Children: None    REVIEW OF SYSTEMS:   Depression and anxiety  Sleep problems.       PHYSICAL EXAMINATIONS:     Blood pressure 118/84, pulse 114, temperature 97.9  F (36.6  C), temperature source Temporal, height 1.753 m (5' 9\"), weight 85.5 kg (188 lb 6.4 oz), SpO2 98%.    General exam: General Appearance:  No acute distress.  HEENT:  Normocephalic, atraumatic.  Neck:  Supple, no lymphadenopathy. Extremities:  No edema.      Neurologic Exam:  Alert and oriented x3.  Speech fluent, appropriate. Normal attention.  Cranial Nerves:  Pupils are equal, round, reactive to light and accomodation.  Extraocular movement intact.  No facial weakness or asymmetry.  Facial sensation was normal.  Tongue and palate midline.  Hearing normal.  Visual field : normal to confrontation.   Motor Exam:  Bilateral hand tremors. Normal bulk.  Normal tone.  Strength 5/5 in all extremities.  Sensory:  Normal to light touch and vibration in all extremities.  Deep tendon reflexes 2+ bilaterally in both upper and lower extremities.  Coordination:  Finger-to-nose, heel-to-shin exams showed no ataxia.  Rapid alternating movement was normal.  Gait and Station:  normal casual gait.  Normal tandem gait. No difficulties with tip-toe or heel walking.      PREVIOUS DIAGNOSTIC TESTING:    MRI brain: Not done  CT head: Normal.    EE2024  Normal      IMPRESSION:  This patient is a 33 year old years old right handed male presented with 2 black out spells, one in Aug. 2023 and one in 2024. Both were probably related to ETOH withdrawal.     We would like to have MRI for further evaluation. No AEDs are indicated at this time.      PLAN:    MRI  brain  No AEDs " are indicated at this time.  Patient may go back to work with no restriction. Patient was advised that he need to contact clinic if these spells recur. He is working on stopping drinking.  RTC in 3 months.      Vincenzo Singh MD      The longitudinal plan of care for seizures was addressed during this visit. Due to the added complexity in care, I will continue to support this patient in the subsequent management of this condition and with the ongoing continuity of care of this condition.       70 min total time was spent on the day of this visit.      45 min was spent on face to face time  25 min was spent on preparation of visit to review charts and labs, ordering medications and tests, and documentation of clinical information, letter to employer

## 2024-06-06 NOTE — LETTER
2024       RE: Carter Ervin  : 1990   MRN: [id number]      2024      RE: Carter Ervin      To whom it may concern,    This letter is to inform that Mr. Carter Ervin is currently under my care for his medical condition.  He may go back to work on , with no restrictions.    Please feel free to contact me at [phone] if you have any questions.      Sincerely,      Vincenzo Singh MD  [facility]         16-Nov-2017 04:22

## 2024-06-06 NOTE — LETTER
2024       RE: Carter Ervin  : 1990   MRN: [id number]          Dear Colleague,    Thank you for referring your patient, Carter Ervin, to the [facility] at [facility]. Please see a copy of my visit note below.    2024    CHIEF COMPLAINT:  Seizures.    HISTORY OF PRESENT ILLNESS:  This patient is a 33 year old years old right handed male presented for evaluation for seizure like activities.  He needs to be cleared for work. Patient is by himself in the clinic today.    Patient had history of Moderate episode of recurrent major depressive disorder, Anxiety disorder, Essential hypertension and Lord's esophagus without dysplasia. In 2024, he was at work, and was found unresponsive and confused at 3pm. He was found on the ground. No one witnessed how he went down to the ground. He woke up and was not coherent. He had no clear postictal confusion or fatigue. He did not remember anything about the spell. No clear triggers. He had tongue    He did not eat much for about 2 days. He usually drinks 10-11 beers daily. He probably had about 8 beers the day before the ED, and had no drink the day of the ED.    He was sent to ED. He had a head CT which was normal. All other tests were reported normal, although he can not remember what tests were done.    He has another similar event in Aug. 2023. He was sent to the ED. This was considered an alcohol withdrawal seizure.    TRIGGERS FOR SEIZURES:    ETOH withdrawal?    RISK FACTORS FOR SEIZURES:  No history of head trauma with loss of consciousness, no history of CNS infection, no history of febrile convulsions.  No history of brain tumor or stroke. Normal development.     ALLERGIES:  Morphine    CURRENT MEDICATIONS:    Current Outpatient Medications   Medication Sig Dispense Refill    citalopram (CELEXA) 40 MG tablet TAKE 2 TABLETS (80 MG) BY MOUTH ONCE DAILY.      lisinopril  "(ZESTRIL) 10 MG tablet TAKE 1 TABLET (10 MG) BY MOUTH ONCE DAILY.      omeprazole (PRILOSEC) 20 MG DR capsule TAKE 1 CAPSULE (20 MG) BY MOUTH ONCE DAILY ON AN EMPTY STOMACH.       PAST AEDs:  None    PAST MEDICAL HISTORY:  Depression  Anxiety  Acid reflux  HTN    PAST SURGICAL HISTORY:  Knee surgery reconstructive 2009  Finger graft/seperatioin    Family History:  History of seizures:   None    SOCIAL HISTORY:  Born and raised: [place]   Education: High school, regular classes  Work:   Tobacco: None, ETOH: 10-11 beers a day, Drugs: Occasional Marijuana    Family: , Children: None    REVIEW OF SYSTEMS:   Depression and anxiety  Sleep problems.       PHYSICAL EXAMINATIONS:     Blood pressure 118/84, pulse 114, temperature 97.9  F (36.6  C), temperature source Temporal, height 1.753 m (5' 9\"), weight 85.5 kg (188 lb 6.4 oz), SpO2 98%.    General exam: General Appearance:  No acute distress.  HEENT:  Normocephalic, atraumatic.  Neck:  Supple, no lymphadenopathy. Extremities:  No edema.      Neurologic Exam:  Alert and oriented x3.  Speech fluent, appropriate. Normal attention.  Cranial Nerves:  Pupils are equal, round, reactive to light and accomodation.  Extraocular movement intact.  No facial weakness or asymmetry.  Facial sensation was normal.  Tongue and palate midline.  Hearing normal.  Visual field : normal to confrontation.   Motor Exam:  Bilateral hand tremors. Normal bulk.  Normal tone.  Strength 5/5 in all extremities.  Sensory:  Normal to light touch and vibration in all extremities.  Deep tendon reflexes 2+ bilaterally in both upper and lower extremities.  Coordination:  Finger-to-nose, heel-to-shin exams showed no ataxia.  Rapid alternating movement was normal.  Gait and Station:  normal casual gait.  Normal tandem gait. No difficulties with tip-toe or heel walking.      PREVIOUS DIAGNOSTIC TESTING:    MRI brain: Not done  CT head: Normal.    EE2024  Normal      IMPRESSION:  This " patient is a 33 year old years old right handed male presented with 2 black out spells, one in Aug. 2023 and one in March 2024. Both were probably related to ETOH withdrawal.     We would like to have MRI for further evaluation. No AEDs are indicated at this time.      PLAN:    MRI  brain  No AEDs are indicated at this time.  Patient may go back to work with no restriction. Patient was advised that he need to contact clinic if these spells recur. He is working on stopping drinking.  RTC in 3 months.      The longitudinal plan of care for seizures was addressed during this visit. Due to the added complexity in care, I will continue to support this patient in the subsequent management of this condition and with the ongoing continuity of care of this condition.       70 min total time was spent on the day of this visit.      45 min was spent on face to face time  25 min was spent on preparation of visit to review charts and labs, ordering medications and tests, and documentation of clinical information, letter to employer        Again, thank you for allowing me to participate in the care of your patient.      Sincerely,    Vincenzo Singh MD

## 2024-06-06 NOTE — PATIENT INSTRUCTIONS
PLAN:    MRI  brain  No AEDs are indicated at this time.  Patient may go back to work.  RTC in 3 months.

## 2024-06-09 ENCOUNTER — APPOINTMENT (OUTPATIENT)
Dept: CT IMAGING | Facility: HOSPITAL | Age: 34
End: 2024-06-09
Attending: PHYSICIAN ASSISTANT
Payer: COMMERCIAL

## 2024-06-09 ENCOUNTER — HOSPITAL ENCOUNTER (EMERGENCY)
Facility: HOSPITAL | Age: 34
Discharge: ANOTHER HOSPITAL | End: 2024-06-09
Attending: PHYSICIAN ASSISTANT | Admitting: PHYSICIAN ASSISTANT
Payer: COMMERCIAL

## 2024-06-09 VITALS
DIASTOLIC BLOOD PRESSURE: 100 MMHG | TEMPERATURE: 97.6 F | HEART RATE: 99 BPM | OXYGEN SATURATION: 93 % | SYSTOLIC BLOOD PRESSURE: 140 MMHG | RESPIRATION RATE: 16 BRPM

## 2024-06-09 DIAGNOSIS — F10.11 HISTORY OF ETOH ABUSE: ICD-10-CM

## 2024-06-09 DIAGNOSIS — R79.89 ELEVATED LFTS: ICD-10-CM

## 2024-06-09 DIAGNOSIS — E80.6 HYPERBILIRUBINEMIA: ICD-10-CM

## 2024-06-09 DIAGNOSIS — R10.11 RUQ ABDOMINAL PAIN: ICD-10-CM

## 2024-06-09 LAB
ALBUMIN SERPL BCG-MCNC: 3.2 G/DL (ref 3.5–5.2)
ALP SERPL-CCNC: 539 U/L (ref 40–150)
ALT SERPL W P-5'-P-CCNC: 64 U/L (ref 0–70)
ANION GAP SERPL CALCULATED.3IONS-SCNC: 15 MMOL/L (ref 7–15)
AST SERPL W P-5'-P-CCNC: 239 U/L (ref 0–45)
BASOPHILS # BLD AUTO: 0 10E3/UL (ref 0–0.2)
BASOPHILS NFR BLD AUTO: 1 %
BILIRUB DIRECT SERPL-MCNC: ABNORMAL MG/DL
BILIRUB SERPL-MCNC: 7.5 MG/DL
BUN SERPL-MCNC: 5.8 MG/DL (ref 6–20)
CALCIUM SERPL-MCNC: 8.9 MG/DL (ref 8.6–10)
CHLORIDE SERPL-SCNC: 86 MMOL/L (ref 98–107)
CREAT SERPL-MCNC: 0.92 MG/DL (ref 0.67–1.17)
DEPRECATED HCO3 PLAS-SCNC: 23 MMOL/L (ref 22–29)
EGFRCR SERPLBLD CKD-EPI 2021: >90 ML/MIN/1.73M2
EOSINOPHIL # BLD AUTO: 0 10E3/UL (ref 0–0.7)
EOSINOPHIL NFR BLD AUTO: 1 %
ERYTHROCYTE [DISTWIDTH] IN BLOOD BY AUTOMATED COUNT: 18.6 % (ref 10–15)
GLUCOSE SERPL-MCNC: 128 MG/DL (ref 70–99)
HCT VFR BLD AUTO: 34.3 % (ref 40–53)
HGB BLD-MCNC: 12.3 G/DL (ref 13.3–17.7)
HOLD SPECIMEN: NORMAL
IMM GRANULOCYTES # BLD: 0 10E3/UL
IMM GRANULOCYTES NFR BLD: 1 %
LIPASE SERPL-CCNC: 173 U/L (ref 13–60)
LYMPHOCYTES # BLD AUTO: 0.9 10E3/UL (ref 0.8–5.3)
LYMPHOCYTES NFR BLD AUTO: 14 %
MCH RBC QN AUTO: 30.8 PG (ref 26.5–33)
MCHC RBC AUTO-ENTMCNC: 35.9 G/DL (ref 31.5–36.5)
MCV RBC AUTO: 86 FL (ref 78–100)
MONOCYTES # BLD AUTO: 0.4 10E3/UL (ref 0–1.3)
MONOCYTES NFR BLD AUTO: 6 %
NEUTROPHILS # BLD AUTO: 4.9 10E3/UL (ref 1.6–8.3)
NEUTROPHILS NFR BLD AUTO: 79 %
NRBC # BLD AUTO: 0 10E3/UL
NRBC BLD AUTO-RTO: 0 /100
PLATELET # BLD AUTO: 81 10E3/UL (ref 150–450)
POTASSIUM SERPL-SCNC: 3.9 MMOL/L (ref 3.4–5.3)
PROT SERPL-MCNC: 6.8 G/DL (ref 6.4–8.3)
RBC # BLD AUTO: 4 10E6/UL (ref 4.4–5.9)
SODIUM SERPL-SCNC: 124 MMOL/L (ref 135–145)
WBC # BLD AUTO: 6.2 10E3/UL (ref 4–11)

## 2024-06-09 PROCEDURE — 96375 TX/PRO/DX INJ NEW DRUG ADDON: CPT

## 2024-06-09 PROCEDURE — 80048 BASIC METABOLIC PNL TOTAL CA: CPT | Performed by: PHYSICIAN ASSISTANT

## 2024-06-09 PROCEDURE — 85025 COMPLETE CBC W/AUTO DIFF WBC: CPT | Performed by: PHYSICIAN ASSISTANT

## 2024-06-09 PROCEDURE — 250N000011 HC RX IP 250 OP 636: Mod: JZ | Performed by: PHYSICIAN ASSISTANT

## 2024-06-09 PROCEDURE — 96376 TX/PRO/DX INJ SAME DRUG ADON: CPT

## 2024-06-09 PROCEDURE — 83690 ASSAY OF LIPASE: CPT | Performed by: PHYSICIAN ASSISTANT

## 2024-06-09 PROCEDURE — 258N000003 HC RX IP 258 OP 636: Mod: JZ | Performed by: PHYSICIAN ASSISTANT

## 2024-06-09 PROCEDURE — 99285 EMERGENCY DEPT VISIT HI MDM: CPT | Performed by: PHYSICIAN ASSISTANT

## 2024-06-09 PROCEDURE — 99285 EMERGENCY DEPT VISIT HI MDM: CPT | Mod: 25

## 2024-06-09 PROCEDURE — 96374 THER/PROPH/DIAG INJ IV PUSH: CPT | Mod: XU

## 2024-06-09 PROCEDURE — 96361 HYDRATE IV INFUSION ADD-ON: CPT

## 2024-06-09 PROCEDURE — 74177 CT ABD & PELVIS W/CONTRAST: CPT

## 2024-06-09 PROCEDURE — 36415 COLL VENOUS BLD VENIPUNCTURE: CPT | Performed by: PHYSICIAN ASSISTANT

## 2024-06-09 RX ORDER — HYDROMORPHONE HYDROCHLORIDE 1 MG/ML
0.5 INJECTION, SOLUTION INTRAMUSCULAR; INTRAVENOUS; SUBCUTANEOUS
Status: DISCONTINUED | OUTPATIENT
Start: 2024-06-09 | End: 2024-06-09 | Stop reason: HOSPADM

## 2024-06-09 RX ORDER — IOPAMIDOL 755 MG/ML
100 INJECTION, SOLUTION INTRAVASCULAR ONCE
Status: COMPLETED | OUTPATIENT
Start: 2024-06-09 | End: 2024-06-09

## 2024-06-09 RX ORDER — ONDANSETRON 2 MG/ML
4 INJECTION INTRAMUSCULAR; INTRAVENOUS EVERY 30 MIN PRN
Status: DISCONTINUED | OUTPATIENT
Start: 2024-06-09 | End: 2024-06-09 | Stop reason: HOSPADM

## 2024-06-09 RX ADMIN — IOPAMIDOL 92 ML: 755 INJECTION, SOLUTION INTRAVENOUS at 16:31

## 2024-06-09 RX ADMIN — ONDANSETRON 4 MG: 2 INJECTION INTRAMUSCULAR; INTRAVENOUS at 15:56

## 2024-06-09 RX ADMIN — SODIUM CHLORIDE, POTASSIUM CHLORIDE, SODIUM LACTATE AND CALCIUM CHLORIDE 1000 ML: 600; 310; 30; 20 INJECTION, SOLUTION INTRAVENOUS at 15:58

## 2024-06-09 RX ADMIN — HYDROMORPHONE HYDROCHLORIDE 0.5 MG: 1 INJECTION, SOLUTION INTRAMUSCULAR; INTRAVENOUS; SUBCUTANEOUS at 16:41

## 2024-06-09 RX ADMIN — HYDROMORPHONE HYDROCHLORIDE 0.5 MG: 1 INJECTION, SOLUTION INTRAMUSCULAR; INTRAVENOUS; SUBCUTANEOUS at 17:45

## 2024-06-09 ASSESSMENT — COLUMBIA-SUICIDE SEVERITY RATING SCALE - C-SSRS
2. HAVE YOU ACTUALLY HAD ANY THOUGHTS OF KILLING YOURSELF IN THE PAST MONTH?: NO
1. IN THE PAST MONTH, HAVE YOU WISHED YOU WERE DEAD OR WISHED YOU COULD GO TO SLEEP AND NOT WAKE UP?: NO
6. HAVE YOU EVER DONE ANYTHING, STARTED TO DO ANYTHING, OR PREPARED TO DO ANYTHING TO END YOUR LIFE?: NO

## 2024-06-09 ASSESSMENT — ACTIVITIES OF DAILY LIVING (ADL)
ADLS_ACUITY_SCORE: 35

## 2024-06-09 NOTE — ED PROVIDER NOTES
History     Chief Complaint   Patient presents with    Abdominal Pain     C/o rt upper abd pain and vomiting since Friday. Notes hx ETOH.      HPI  Carter Ervin is a 33 year old male presents to the emergency room due to right upper quadrant abdominal pain, onset x 3 days ago.  Associated symptoms of significant nausea with blood tinged vomiting, poor appetite, jaundice with his eyes and face.  His bowel movements are irregular, describes them as jojo colored.  No prior similar symptoms.  No history of abdominal surgery.  Denies fevers, chills, sweats, illness, dysuria, chest pain, dyspnea.    Of note, patient currently being followed by neurology due to first-time seizure approximately 1 month ago.  Admits to heavy drinking alcohol, workup for EEG does not show any seizure activity.  His providers are trying to determine if seizure related to alcohol withdrawal, but states that his seizure occurred less than 12 hours after his last alcoholic drink.  He does admit to 8-12+ beers per day.  Does not have any significant signs of withdrawal or hospitalizations due to alcohol.  No prior history of pancreatitis.      Allergies:  Allergies   Allergen Reactions    Morphine Other (See Comments)     insomnia       Past Medical History:    GERD  Hypertension  Anxiety/depression    Past Surgical History:    Right knee reconstruction    Family History:    No family history on file.    Social History:  Marital Status:   [2]  Social History     Tobacco Use    Smoking status: Never    Smokeless tobacco: Current     Types: Chew   Substance Use Topics    Alcohol use: Yes     Comment: daily drinker , 8 beer/day    Drug use: Not Currently        Medications:    citalopram (CELEXA) 40 MG tablet  lisinopril (ZESTRIL) 10 MG tablet  omeprazole (PRILOSEC) 20 MG  capsule          Review of Systems  Performed with patient.  See pertinent positives above.    Physical Exam   BP: 126/78  Pulse: (!) 123  Temp: 97.6  F (36.4   C)  Resp: 16  SpO2: 97 %    Nontoxic  NAD, appears moderately uncomfortable  Awake, alert, oriented x 3, appropriate interaction behavior  Nonlabored respiratory effort, clear to auscultation bilaterally, no wheezing or stridor, no hypoxia  Hemodynamically stable, SBP 120s, heart rate mild tachycardia 120s, afebrile  Abdomen soft, nondistended, moderate RUQ abdominal tenderness, all other quadrants pain-free, no peritonitis  Skin warm, well-perfused, jaundice and icterus  Moves all extremities    ED Course       ED Course as of 06/09/24 1828   Sun Jun 09, 2024   1657 Albumin(!): 3.2   1657 Hepatic function panel(!)         Results for orders placed or performed during the hospital encounter of 06/09/24 (from the past 24 hour(s))   [place] Draw    Narrative    The following orders were created for panel order [place] Draw.  Procedure                               Abnormality         Status                     ---------                               -----------         ------                     Extra Blue Top Tube[[id number]]                              Final result               Extra Red Top Tube[[id number]]                               Final result               Extra Green Top (Lithium...[[id number]]                      Final result               Extra Purple Top Tube[[id number]]                            Final result               Extra Heparinized Syringe[[id number]]                        Final result                 Please view results for these tests on the individual orders.   Extra Blue Top Tube   Result Value Ref Range    Hold Specimen JIC    Extra Red Top Tube   Result Value Ref Range    Hold Specimen JIC    Extra Green Top (Lithium Heparin) Tube   Result Value Ref Range    Hold Specimen JIC    Extra Purple Top Tube   Result Value Ref Range    Hold Specimen JIC    Extra Heparinized Syringe   Result Value Ref Range    Hold Specimen JIC    CBC with platelets differential    Narrative    The following  orders were created for panel order CBC with platelets differential.  Procedure                               Abnormality         Status                     ---------                               -----------         ------                     CBC with platelets and d...[[id number]]  Abnormal            Final result               RBC and Platelet Morphology[[id number]]                                                   Please view results for these tests on the individual orders.   Basic metabolic panel   Result Value Ref Range    Sodium 124 (L) 135 - 145 mmol/L    Potassium 3.9 3.4 - 5.3 mmol/L    Chloride 86 (L) 98 - 107 mmol/L    Carbon Dioxide (CO2) 23 22 - 29 mmol/L    Anion Gap 15 7 - 15 mmol/L    Urea Nitrogen 5.8 (L) 6.0 - 20.0 mg/dL    Creatinine 0.92 0.67 - 1.17 mg/dL    GFR Estimate >90 >60 mL/min/1.73m2    Calcium 8.9 8.6 - 10.0 mg/dL    Glucose 128 (H) 70 - 99 mg/dL   Hepatic function panel   Result Value Ref Range    Protein Total 6.8 6.4 - 8.3 g/dL    Albumin 3.2 (L) 3.5 - 5.2 g/dL    Bilirubin Total 7.5 (H) <=1.2 mg/dL    Alkaline Phosphatase 539 (H) 40 - 150 U/L     (H) 0 - 45 U/L    ALT 64 0 - 70 U/L    Bilirubin Direct     Lipase   Result Value Ref Range    Lipase 173 (H) 13 - 60 U/L   CBC with platelets and differential   Result Value Ref Range    WBC Count 6.2 4.0 - 11.0 10e3/uL    RBC Count 4.00 (L) 4.40 - 5.90 10e6/uL    Hemoglobin 12.3 (L) 13.3 - 17.7 g/dL    Hematocrit 34.3 (L) 40.0 - 53.0 %    MCV 86 78 - 100 fL    MCH 30.8 26.5 - 33.0 pg    MCHC 35.9 31.5 - 36.5 g/dL    RDW 18.6 (H) 10.0 - 15.0 %    Platelet Count 81 (L) 150 - 450 10e3/uL    % Neutrophils 79 %    % Lymphocytes 14 %    % Monocytes 6 %    % Eosinophils 1 %    % Basophils 1 %    % Immature Granulocytes 1 %    NRBCs per 100 WBC 0 <1 /100    Absolute Neutrophils 4.9 1.6 - 8.3 10e3/uL    Absolute Lymphocytes 0.9 0.8 - 5.3 10e3/uL    Absolute Monocytes 0.4 0.0 - 1.3 10e3/uL    Absolute Eosinophils 0.0 0.0 - 0.7 10e3/uL     Absolute Basophils 0.0 0.0 - 0.2 10e3/uL    Absolute Immature Granulocytes 0.0 <=0.4 10e3/uL    Absolute NRBCs 0.0 10e3/uL   CT Abdomen Pelvis w Contrast    Narrative    EXAM: CT ABDOMEN PELVIS W CONTRAST 6/9/2024 4:47 PM    HISTORY: RUQ abd pain    COMPARISON: No relevant priors available for comparison    TECHNIQUE:   Imaging protocol: Computed tomography of the abdomen and pelvis with  imaging acquired after administration of intravenous contrast.  Intravenous contrast: isovue 370 92mL.  Acquisition: This CT exam was performed using one or more the  following dose reduction techniques: automated exposure control,  adjustment of the mA and/or kV according to patient size, and/or  iterative reconstruction technique.    FINDINGS:    LOWER CHEST:  Bibasilar atelectasis. No pulmonary mass or focal consolidation. Left  lower lobe granuloma.    ABDOMEN/PELVIS:  LIVER: Normal contour. No suspicious liver lesions. Heterogeneous  hypoattenuation of the liver parenchyma, consistent with hepatic  steatosis.  GALLBLADDER: No radio-opaque stones. No gallbladder wall thickening.  BILE DUCTS: No biliary tract dilatation.  PANCREAS: Within normal limits.  SPLEEN: Within normal limits.  ADRENALS: Within normal limits.    KIDNEYS/URETERS: No soft tissue mass. No hydronephrosis. No  nephrolithiasis.  URINARY BLADDER: Within normal limits.  REPRODUCTIVE ORGANS: No pelvic masses.    BOWEL: No bowel dilatation or wall thickening. Normal appendix.  PERITONEUM/RETROPERITONEUM: No free air or free fluid.  VESSELS: Within normal limits.  LYMPH NODES: There are no pathologically enlarged lymph nodes.    BONES AND SOFT TISSUES:  No suspicious osseous lesions. Minimal periarticular degenerative  changes and spinal spondylosis.      Impression    IMPRESSION:  No acute abnormality in the abdomen and pelvis.     Marked hepatic steatosis.    WILLIAN ROBERTS MD         SYSTEM ID:  [id number]       Medications   ondansetron (ZOFRAN) injection 4 mg (4  mg Intravenous $Given 6/9/24 1556)   HYDROmorphone (PF) (DILAUDID) injection 0.5 mg (0.5 mg Intravenous $Given 6/9/24 4765)   lactated ringers BOLUS 1,000 mL (1,000 mLs Intravenous $New Bag 6/9/24 1558)   sodium chloride (PF) 0.9% PF flush 60 mL (60 mLs Intravenous $Given 6/9/24 1631)   iopamidol (ISOVUE-370) solution 100 mL (92 mLs Intravenous $Given 6/9/24 1631)         Assessments & Plan (with Medical Decision Making)   Carter Ervin presents to the Emergency Room with right upper quadrant abdominal pain onset x 3 days, found to have significant hyperbilirubinemia, concerning for choledocholithiasis vs alcohol related hepatitis given his ETOH drinking hx.    -Nontoxic, and no acute abdomen  -T.bilirubin 7.5, significant elevation with LFTs  -No leukocytosis, afebrile    -Ultrasound not available, so opted for CT imaging, did not show significant dilation of CBD or gallbladder, no no radiopaque stones as well    -Will need US to further evaluate if stones present or findings of choledocholithiasis    -Pending US findings may need US/ERCP per GI and subsequent cholecystectomy.    -Pain and nausea control given prior to discharge  -Due to poor appetite over the last few days, given 1 L LR bolus  -ok for clear liquids as tolerated  -remove PIV then can discharge    -Patient verbally educated on their diagnoses and treatment plan.  Him and his wife have no further questions or concerns.       Disposition: Transfer to [facility] in [place], [place].  Discussed plan with Dr. Maxi Bolaños/Acute Care Surgery, who agrees and accepts patient for admission to medical surgical floor 14.          Final diagnoses:   RUQ abdominal pain   Hyperbilirubinemia   Elevated LFTs   History of ETOH abuse       6/9/2024   [facility]       Abdi Sanchez PA-C  06/09/24 9216

## 2024-06-10 LAB — HEP C HIM: NORMAL

## 2024-06-12 ENCOUNTER — OFFICE VISIT (OUTPATIENT)
Dept: PSYCHIATRY | Facility: OTHER | Age: 34
End: 2024-06-12
Payer: COMMERCIAL

## 2024-06-12 VITALS
BODY MASS INDEX: 28.14 KG/M2 | SYSTOLIC BLOOD PRESSURE: 132 MMHG | HEART RATE: 93 BPM | DIASTOLIC BLOOD PRESSURE: 84 MMHG | HEIGHT: 69 IN | WEIGHT: 190 LBS | TEMPERATURE: 98.5 F | OXYGEN SATURATION: 96 %

## 2024-06-12 DIAGNOSIS — F41.1 GAD (GENERALIZED ANXIETY DISORDER): ICD-10-CM

## 2024-06-12 DIAGNOSIS — F10.21 ALCOHOL USE DISORDER, SEVERE, IN EARLY REMISSION (H): Primary | ICD-10-CM

## 2024-06-12 DIAGNOSIS — F33.1 MAJOR DEPRESSIVE DISORDER, RECURRENT EPISODE, MODERATE (H): ICD-10-CM

## 2024-06-12 PROCEDURE — 99205 OFFICE O/P NEW HI 60 MIN: CPT

## 2024-06-12 RX ORDER — GABAPENTIN 100 MG/1
100 CAPSULE ORAL 3 TIMES DAILY
Qty: 90 CAPSULE | Refills: 2 | Status: SHIPPED | OUTPATIENT
Start: 2024-06-12 | End: 2024-07-17

## 2024-06-12 RX ORDER — CITALOPRAM HYDROBROMIDE 40 MG/1
40 TABLET ORAL DAILY
Qty: 30 TABLET | Refills: 2 | Status: SHIPPED | OUTPATIENT
Start: 2024-06-12 | End: 2024-07-17 | Stop reason: DRUGHIGH

## 2024-06-12 RX ORDER — HYDROXYZINE HYDROCHLORIDE 25 MG/1
25-50 TABLET, FILM COATED ORAL 3 TIMES DAILY PRN
Qty: 180 TABLET | Refills: 2 | Status: SHIPPED | OUTPATIENT
Start: 2024-06-12 | End: 2024-07-17

## 2024-06-12 RX ORDER — NALTREXONE HYDROCHLORIDE 50 MG/1
50 TABLET, FILM COATED ORAL DAILY
Qty: 30 TABLET | Refills: 2 | Status: SHIPPED | OUTPATIENT
Start: 2024-06-12 | End: 2024-07-17

## 2024-06-12 ASSESSMENT — PATIENT HEALTH QUESTIONNAIRE - PHQ9
10. IF YOU CHECKED OFF ANY PROBLEMS, HOW DIFFICULT HAVE THESE PROBLEMS MADE IT FOR YOU TO DO YOUR WORK, TAKE CARE OF THINGS AT HOME, OR GET ALONG WITH OTHER PEOPLE: SOMEWHAT DIFFICULT
SUM OF ALL RESPONSES TO PHQ QUESTIONS 1-9: 9
SUM OF ALL RESPONSES TO PHQ QUESTIONS 1-9: 9

## 2024-06-12 ASSESSMENT — ANXIETY QUESTIONNAIRES
4. TROUBLE RELAXING: SEVERAL DAYS
GAD7 TOTAL SCORE: 13
8. IF YOU CHECKED OFF ANY PROBLEMS, HOW DIFFICULT HAVE THESE MADE IT FOR YOU TO DO YOUR WORK, TAKE CARE OF THINGS AT HOME, OR GET ALONG WITH OTHER PEOPLE?: SOMEWHAT DIFFICULT
2. NOT BEING ABLE TO STOP OR CONTROL WORRYING: NEARLY EVERY DAY
GAD7 TOTAL SCORE: 13
1. FEELING NERVOUS, ANXIOUS, OR ON EDGE: NEARLY EVERY DAY
3. WORRYING TOO MUCH ABOUT DIFFERENT THINGS: NEARLY EVERY DAY
6. BECOMING EASILY ANNOYED OR IRRITABLE: SEVERAL DAYS
5. BEING SO RESTLESS THAT IT IS HARD TO SIT STILL: MORE THAN HALF THE DAYS
7. FEELING AFRAID AS IF SOMETHING AWFUL MIGHT HAPPEN: NOT AT ALL
GAD7 TOTAL SCORE: 13
7. FEELING AFRAID AS IF SOMETHING AWFUL MIGHT HAPPEN: NOT AT ALL
IF YOU CHECKED OFF ANY PROBLEMS ON THIS QUESTIONNAIRE, HOW DIFFICULT HAVE THESE PROBLEMS MADE IT FOR YOU TO DO YOUR WORK, TAKE CARE OF THINGS AT HOME, OR GET ALONG WITH OTHER PEOPLE: SOMEWHAT DIFFICULT

## 2024-06-12 ASSESSMENT — PAIN SCALES - GENERAL: PAINLEVEL: NO PAIN (0)

## 2024-06-12 ASSESSMENT — COLUMBIA-SUICIDE SEVERITY RATING SCALE - C-SSRS
2. HAVE YOU ACTUALLY HAD ANY THOUGHTS OF KILLING YOURSELF?: NO
6. IN YOUR LIFETIME, HAVE YOU EVER DONE ANYTHING, STARTED TO DO ANYTHING, OR PREPARED TO DO ANYTHING TO END YOUR LIFE?: NO
1. IN THE PAST MONTH, HAVE YOU WISHED YOU WERE DEAD OR WISHED YOU COULD GO TO SLEEP AND NOT WAKE UP?: NO

## 2024-06-12 NOTE — PATIENT INSTRUCTIONS
- Start Gabapentin 100 mg - Take 1 cap (100 mg) three times daily  - Start hydroxyzine 25 mg - Take 1-2 tabs (25 mg - 50 mg) three times daily as needed for anxiety  - Start Naltrexone 50 mg - Take 1 tab (50 mg) daily  - Return to clinic in 1 month for follow up  Thank you for allowing Nadine Fofana DNP, APRN to participate in your care.  If you have a scheduling or an appointment question please contact our scheduling department at their direct line [phone].   ALL nursing questions or concerns can be directed to the psychiatry nurses at [phone] or [phone]

## 2024-06-12 NOTE — PROGRESS NOTES
OUTPATIENT PSYCHIATRY: INITIAL ASSESSMENT (ADULT)     Identifying Information:  Carter Ervin MRN# [id number]   Age: 33 year old YOB: 1990     Referral source Toro Huerta MD ([facility])  Reason for referral: Medication management        Assessment & Plan     The patient is a 33 year old male who is seen today to establish care.    (F10.21) Alcohol use disorder, severe, in early remission (H)  (primary encounter diagnosis)  Comment: Health has begun to deteriorate from alcohol use and Hank has now been sober 4 days since hospitalization. He has only been out of the hospital one day. Each ED visit he seems to get more acutely ill and he seems to be more aware of this and is very motivated to stay sober this time. We discussed the addition of naltrexone (Revia) to target alcohol dependence. Educated on the risks, benefits and side effects including nausea, vomiting, decreased appetite, dizziness. Patient consents to treatment.  Plan: START naltrexone (DEPADE/REVIA) 50 MG tablet Take 1 tablet (50 mg) by mouth daily     (F41.1) FAY (generalized anxiety disorder)  Comment: Since being sober anxiety has been much worse. We discussed the addition of gabapentin (Neurontin) to target anxiety. Educated on the risks, benefits and side effects including sedation, dizziness, fatigue, vomiting, diarrhea, dry mouth, constipation, weight gain. Patient consents to treatment.  We also thought something on a prn basis would be helpful. We discussed the addition of hydroxyzine (Atarax) to target anxiety. Educated on the risks, benefits and side effects including sedation, dry mouth, tremor. Patient consents to treatment.  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 We will plan to make the other big changes first, but keep Celexa as it is for now.   Plan: citalopram (CELEXA) 40 MG tablet Take 1 tablet (40 mg) by mouth daily     Laboratory: None    Referrals: None    Follow Up: Return to clinic in 1 month          History of Present Illness     Carter \"Antione Ervin is a 33 year old male who is here today to establish care and discuss treatment options. Patient attended the appointment alone. Hank was referred from [facility] emergency room after a visit on 3/11/24 after a witnessed seizure from alcohol withdrawal. Hank shares he has really bad anxiety, even since childhood, and it's gotten worse over the years. He doesn't have energy for anything, which is newer in the last few years. Depression has been there the whole time.   Alcohol - last drink was 4 days ago - just got out of the hospital 1 day ago. Cravings are not too bad, knows he has to stop for his health.  Using alcohol to self-medicate the anxiety and now that he's quit drinking the anxiety is really coming out.   Drinking never affected work, he only drank at home, never drink and drive.  Seeing Juan Ramon here at [facility] for therapy.  Establishing with Cristina Abbott at [facility] in [place] for primary care.         Psychiatric Review of Systems     Mood/Depression: down, Endorses depressed mood, low energy, insomnia, poor concentration /memory, and overwhelmed, loss of interest in activities.    Anxiety:  Endorses excessive worry, social anxiety, and nervous/overwhelmed. Excessive worry, not really anything specific, beer helped with social anxiety (social butterfly if he was drinking); ok with crowds of people, but not good with talking to people.    Panic Attacks: Denies panic attacks. Never has had panic " attacks    Sleep: trouble falling asleep and trouble staying asleep. Was taking cyproheptadine for about a month; then took trazodone but that stopped working.    Appetite: Poor - been that way for quite a while; not feeling hungry. Not sure if it's changed since he's quit drinking because he has been hospitalized for 3 days and only been out one day    Concentration/Distractibility: a little bit off; able to get tasks done at work, but does go back and double and triple check. Just   Does that with other parts of his life too. Checking to make sure things are right    Activity/Energy: fatigue, exhaustion, this was even before the hospitalization.     Current psychosocial stressors: medical issues,  in-between selling and buying houses  No kids but  - wife is supportive and doing good throughout all this    Suicidal Thoughts [CURRENT]: No   Self-Harm Thoughts [CURRENT]: No     Homicidal Ideation: No     Substance Use: Current use includes: nicotine pouches  _______________________________________________________________    Aletha:  Denies having persistently irritable or euphoric mood, flight of ideas, or increase in goal-directed activity.     Psychosis:  Denies any auditory, visual, or tactile hallucinations.     Delusions/paranoia:  Denies delusions or paranoia.      PTSD:  Denies PTSD symptoms.  Mom attempted suicide - she shot herself but missed, at the same time she also overdosed and was unconscious, he found her, about 5 years ago.    OCD: Denies rituals, repetitive activities, no recurrent thoughts or actions.     ADHD:  No symptoms Task Completion Difficulties     Eating Disorders: No symptoms    Impulse/aggression: Denies any impulse control behaviors  Gambling or shoplifting: No    Therapy: Sees Juan Ramon         Psychiatric History     Self-injurious Behavior: No  Suicidal Ideation History [passive/active]: No  Suicide Attempts: No  Violence toward others: No  History of Psychosis: No  Psychiatric  hospitalizations: No  Prior ECT: No  Court Commitment: No         Social History     Highest education level: high school graduate and some college; 3 years at [employer], 1 year at [employer];   Children: None  Marital status and Living Situation: , lives with wife  Employment/Financial Support:  Work - union  (7 years)   Legal issues: Denies any significant history of legal issues, denies DUI's, denies arrests, intermediate/retirement time, denies assault history.         Substance Use History     Tobacco/Nicotine: Chewing tobacco  Alcohol: Quit 4 days ago  Cannabis: None  Synthetics: None  Cocaine/Heroin: None  Stimulants/Methamphetamine: None  Opiates: None  Benzodiazepines: None  Hallucinogens: None  Other: None    Chemical Dependency treatment: Never been   Participation in NA/AA/Sober Support: Not interested         Past Medical/Surgical History     Primary Care Physician: Cristina Abbott Clinic: [facility]  PCP last visit: 3/28/24 - saw Dr. Joey Pineda at [facility]  No History of: hepatitis, HIV, head trauma with or without loss of consciousness, and seizures (withdrawal)  Seizures: Took an epilepsy test and it is not epilepsy, must be from withdrawals    Medical diagnoses: No past medical history on file.  Surgical history: No past surgical history on file.       Family Psychiatric History     Mental health history: Yes  Chemical use problems: Yes  History of completed suicides in family: No    No family history on file.       Medical Review of Systems     Allergies: Morphine          Vital Signs: There were no vitals taken for this visit.          Weight: 0 lbs 0 oz  BMI: There is no height or weight on file to calculate BMI.    Physical Exam: Please refer to physical exam completed by primary care provider.    10 point review of systems is otherwise negative unless noted above.           Mental Status Examination     Appearance:  awake, alert,  adequately groomed, and appeared as age stated  Alertness:  alert  and oriented  Attitude:  cooperative  Behavior/Demeanor:  cooperative, pleasant, and guarded, with good  eye contact.  Mood:  good and was congruent to speech content.  Affect:  appropriate and in normal range, mood congruent, and intensity is normal  Speech:  regular rate and rhythm  Psychomotor Behavior:  no evidence of tardive dyskinesia, dystonia, or tics and intact station, gait and muscle tone  Thought Process:  logical, linear, and goal oriented without any evidence of loose associations, flight of ideas, tangentiality, or circumstantiality.  Thought Content:  no evidence of suicidal ideation or homicidal ideation, no evidence of psychotic thought, no auditory hallucinations present, and no visual hallucinations present  Insight:  good  Judgment: good  Cognition:  time, person, and place  Attention Span and Concentration:  intact  Recent and Remote Memory:  intact  Language:  no obvious problem  Fund of Knowledge: appropriate  Muscle Strength and Tone: normal  Gait and Station: Normal    These cognitive functions grossly appear as described, but were not formally tested.         Labs     No results found for this or any previous visit (from the past 24 hour(s)).    Labs were reviewed, no concerns.         Medications                                                                  BOLD psych meds     I have reviewed this patient's current medications.    Current Outpatient Medications   Medication Sig Dispense Refill    citalopram (CELEXA) 40 MG tablet TAKE 2 TABLETS (80 MG) BY MOUTH ONCE DAILY.      lisinopril (ZESTRIL) 10 MG tablet TAKE 1 TABLET (10 MG) BY MOUTH ONCE DAILY.      omeprazole (PRILOSEC) 20 MG DR capsule TAKE 1 CAPSULE (20 MG) BY MOUTH ONCE DAILY ON AN EMPTY STOMACH.       No current facility-administered medications for this visit.     Reviewed PDMP: N/A    Past medication trials:   Buspar (21-23)  Celexa (21-22)  Trazodone           PHQ-9 / FAY-7 / Social Determinants of Health                           Reviewed PHQ-9 and FAY-7 screenings.         5/1/2024     8:48 AM 5/23/2024     9:54 AM 6/12/2024    12:06 PM   PHQ-9 SCORE   PHQ-9 Total Score MyChart   9 (Mild depression)   PHQ-9 Total Score 16 11 9         5/1/2024     8:48 AM 5/23/2024     9:54 AM 6/12/2024    12:07 PM   FAY-7 SCORE   Total Score   13 (moderate anxiety)   Total Score 17 12 13              Reviewed previous records including family practice, ED, hospitalization, neurology, psychology (Juan Ramon [facility]). Reviewed and interpreted labs and procedures.    70 minutes spent by me on the date of the encounter doing chart review, review of outside records, review of test results, interpretation of tests, patient visit, and documentation     CLAIRE Finney, PMHNP-BC    Patient was instructed to monitor for worsening symptoms and side effects from medications. If there is a serious deterioration of mood or any dangerous-type behaviors (suicidal/homicidal ideations) patient is advised to call 911 or report directly to the nearest Emergency Department.     Treatment Risk Statement: The risks, benefits, alternatives and potential adverse effects have been explained and are understood by the patient. The patient agrees to the treatment plan with the ability to do so. The patient knows to call the clinic for any problems or access emergency care if needed.

## 2024-06-17 ENCOUNTER — HOSPITAL ENCOUNTER (EMERGENCY)
Facility: HOSPITAL | Age: 34
Discharge: ANOTHER HOSPITAL | End: 2024-06-17
Attending: STUDENT IN AN ORGANIZED HEALTH CARE EDUCATION/TRAINING PROGRAM | Admitting: STUDENT IN AN ORGANIZED HEALTH CARE EDUCATION/TRAINING PROGRAM
Payer: COMMERCIAL

## 2024-06-17 ENCOUNTER — APPOINTMENT (OUTPATIENT)
Dept: ULTRASOUND IMAGING | Facility: HOSPITAL | Age: 34
End: 2024-06-17
Attending: STUDENT IN AN ORGANIZED HEALTH CARE EDUCATION/TRAINING PROGRAM
Payer: COMMERCIAL

## 2024-06-17 VITALS
WEIGHT: 188.49 LBS | HEIGHT: 69 IN | BODY MASS INDEX: 27.92 KG/M2 | OXYGEN SATURATION: 97 % | SYSTOLIC BLOOD PRESSURE: 124 MMHG | RESPIRATION RATE: 16 BRPM | HEART RATE: 83 BPM | TEMPERATURE: 98.9 F | DIASTOLIC BLOOD PRESSURE: 75 MMHG

## 2024-06-17 DIAGNOSIS — K92.1 BLOODY STOOL: ICD-10-CM

## 2024-06-17 LAB
ABO/RH(D): NORMAL
ACANTHOCYTES BLD QL SMEAR: ABNORMAL
ALBUMIN SERPL BCG-MCNC: 3.2 G/DL (ref 3.5–5.2)
ALBUMIN UR-MCNC: NEGATIVE MG/DL
ALP SERPL-CCNC: 306 U/L (ref 40–150)
ALT SERPL W P-5'-P-CCNC: 44 U/L (ref 0–70)
ANION GAP SERPL CALCULATED.3IONS-SCNC: 15 MMOL/L (ref 7–15)
ANTIBODY SCREEN: NEGATIVE
APPEARANCE UR: CLEAR
AST SERPL W P-5'-P-CCNC: 114 U/L (ref 0–45)
AUER BODIES BLD QL SMEAR: ABNORMAL
BASO STIPL BLD QL SMEAR: ABNORMAL
BASOPHILS # BLD AUTO: 0.1 10E3/UL (ref 0–0.2)
BASOPHILS NFR BLD AUTO: 1 %
BILIRUB DIRECT SERPL-MCNC: >10 MG/DL (ref 0–0.3)
BILIRUB SERPL-MCNC: 13.7 MG/DL
BILIRUB UR QL STRIP: ABNORMAL
BITE CELLS BLD QL SMEAR: ABNORMAL
BLISTER CELLS BLD QL SMEAR: ABNORMAL
BUN SERPL-MCNC: 4 MG/DL (ref 6–20)
BURR CELLS BLD QL SMEAR: ABNORMAL
CALCIUM SERPL-MCNC: 9 MG/DL (ref 8.6–10)
CHLORIDE SERPL-SCNC: 92 MMOL/L (ref 98–107)
COLOR UR AUTO: ABNORMAL
CREAT SERPL-MCNC: 0.77 MG/DL (ref 0.67–1.17)
DACRYOCYTES BLD QL SMEAR: ABNORMAL
DEPRECATED HCO3 PLAS-SCNC: 22 MMOL/L (ref 22–29)
EGFRCR SERPLBLD CKD-EPI 2021: >90 ML/MIN/1.73M2
ELLIPTOCYTES BLD QL SMEAR: ABNORMAL
EOSINOPHIL # BLD AUTO: 0 10E3/UL (ref 0–0.7)
EOSINOPHIL NFR BLD AUTO: 1 %
ERYTHROCYTE [DISTWIDTH] IN BLOOD BY AUTOMATED COUNT: 24.6 % (ref 10–15)
ETHANOL SERPL-MCNC: <0.01 G/DL
FRAGMENTS BLD QL SMEAR: ABNORMAL
GIANT PLATELETS BLD QL SMEAR: ABNORMAL
GLUCOSE SERPL-MCNC: 129 MG/DL (ref 70–99)
GLUCOSE UR STRIP-MCNC: NEGATIVE MG/DL
HCT VFR BLD AUTO: 29.4 % (ref 40–53)
HEMOCCULT SP1 STL QL: POSITIVE
HGB BLD-MCNC: 10.2 G/DL (ref 13.3–17.7)
HGB C CRYSTALS: ABNORMAL
HGB UR QL STRIP: NEGATIVE
HOLD SPECIMEN: NORMAL
HOWELL-JOLLY BOD BLD QL SMEAR: ABNORMAL
IMM GRANULOCYTES # BLD: 0.1 10E3/UL
IMM GRANULOCYTES NFR BLD: 1 %
INR PPP: 0.96 (ref 0.85–1.15)
KETONES UR STRIP-MCNC: NEGATIVE MG/DL
LACTATE SERPL-SCNC: 1.9 MMOL/L (ref 0.7–2)
LEUKOCYTE ESTERASE UR QL STRIP: NEGATIVE
LIPASE SERPL-CCNC: 100 U/L (ref 13–60)
LYMPHOCYTES # BLD AUTO: 1.2 10E3/UL (ref 0.8–5.3)
LYMPHOCYTES NFR BLD AUTO: 14 %
MAGNESIUM SERPL-MCNC: 2 MG/DL (ref 1.7–2.3)
MCH RBC QN AUTO: 31 PG (ref 26.5–33)
MCHC RBC AUTO-ENTMCNC: 34.7 G/DL (ref 31.5–36.5)
MCV RBC AUTO: 89 FL (ref 78–100)
MONOCYTES # BLD AUTO: 0.9 10E3/UL (ref 0–1.3)
MONOCYTES NFR BLD AUTO: 11 %
NEUTROPHILS # BLD AUTO: 6.2 10E3/UL (ref 1.6–8.3)
NEUTROPHILS NFR BLD AUTO: 73 %
NEUTS HYPERSEG BLD QL SMEAR: ABNORMAL
NITRATE UR QL: NEGATIVE
NRBC # BLD AUTO: 0 10E3/UL
NRBC BLD AUTO-RTO: 0 /100
PATH REV: ABNORMAL
PH UR STRIP: 6.5 [PH] (ref 4.7–8)
PLAT MORPH BLD: ABNORMAL
PLATELET # BLD AUTO: 186 10E3/UL (ref 150–450)
POLYCHROMASIA BLD QL SMEAR: ABNORMAL
POTASSIUM SERPL-SCNC: 3.3 MMOL/L (ref 3.4–5.3)
PROT SERPL-MCNC: 6.7 G/DL (ref 6.4–8.3)
RBC # BLD AUTO: 3.29 10E6/UL (ref 4.4–5.9)
RBC AGGLUT BLD QL: ABNORMAL
RBC MORPH BLD: ABNORMAL
RBC URINE: 0 /HPF
ROULEAUX BLD QL SMEAR: ABNORMAL
SICKLE CELLS BLD QL SMEAR: ABNORMAL
SMUDGE CELLS BLD QL SMEAR: ABNORMAL
SODIUM SERPL-SCNC: 129 MMOL/L (ref 135–145)
SP GR UR STRIP: 1 (ref 1–1.03)
SPECIMEN EXPIRATION DATE: NORMAL
SPHEROCYTES BLD QL SMEAR: ABNORMAL
SQUAMOUS EPITHELIAL: 0 /HPF
STOMATOCYTES BLD QL SMEAR: ABNORMAL
TARGETS BLD QL SMEAR: ABNORMAL
TOXIC GRANULES BLD QL SMEAR: ABNORMAL
UROBILINOGEN UR STRIP-MCNC: 2 MG/DL
VARIANT LYMPHS BLD QL SMEAR: ABNORMAL
WBC # BLD AUTO: 8.4 10E3/UL (ref 4–11)
WBC URINE: <1 /HPF

## 2024-06-17 PROCEDURE — 93010 ELECTROCARDIOGRAM REPORT: CPT | Performed by: INTERNAL MEDICINE

## 2024-06-17 PROCEDURE — 85610 PROTHROMBIN TIME: CPT | Performed by: STUDENT IN AN ORGANIZED HEALTH CARE EDUCATION/TRAINING PROGRAM

## 2024-06-17 PROCEDURE — 99285 EMERGENCY DEPT VISIT HI MDM: CPT | Mod: 25

## 2024-06-17 PROCEDURE — 76705 ECHO EXAM OF ABDOMEN: CPT

## 2024-06-17 PROCEDURE — 80053 COMPREHEN METABOLIC PANEL: CPT | Performed by: STUDENT IN AN ORGANIZED HEALTH CARE EDUCATION/TRAINING PROGRAM

## 2024-06-17 PROCEDURE — 82077 ASSAY SPEC XCP UR&BREATH IA: CPT | Performed by: STUDENT IN AN ORGANIZED HEALTH CARE EDUCATION/TRAINING PROGRAM

## 2024-06-17 PROCEDURE — 99285 EMERGENCY DEPT VISIT HI MDM: CPT | Performed by: STUDENT IN AN ORGANIZED HEALTH CARE EDUCATION/TRAINING PROGRAM

## 2024-06-17 PROCEDURE — C9113 INJ PANTOPRAZOLE SODIUM, VIA: HCPCS | Mod: JZ | Performed by: STUDENT IN AN ORGANIZED HEALTH CARE EDUCATION/TRAINING PROGRAM

## 2024-06-17 PROCEDURE — 85025 COMPLETE CBC W/AUTO DIFF WBC: CPT | Performed by: STUDENT IN AN ORGANIZED HEALTH CARE EDUCATION/TRAINING PROGRAM

## 2024-06-17 PROCEDURE — 83605 ASSAY OF LACTIC ACID: CPT | Performed by: STUDENT IN AN ORGANIZED HEALTH CARE EDUCATION/TRAINING PROGRAM

## 2024-06-17 PROCEDURE — 93005 ELECTROCARDIOGRAM TRACING: CPT

## 2024-06-17 PROCEDURE — 86900 BLOOD TYPING SEROLOGIC ABO: CPT | Performed by: STUDENT IN AN ORGANIZED HEALTH CARE EDUCATION/TRAINING PROGRAM

## 2024-06-17 PROCEDURE — 81001 URINALYSIS AUTO W/SCOPE: CPT | Performed by: STUDENT IN AN ORGANIZED HEALTH CARE EDUCATION/TRAINING PROGRAM

## 2024-06-17 PROCEDURE — 250N000011 HC RX IP 250 OP 636: Mod: JZ | Performed by: STUDENT IN AN ORGANIZED HEALTH CARE EDUCATION/TRAINING PROGRAM

## 2024-06-17 PROCEDURE — 36415 COLL VENOUS BLD VENIPUNCTURE: CPT | Performed by: STUDENT IN AN ORGANIZED HEALTH CARE EDUCATION/TRAINING PROGRAM

## 2024-06-17 PROCEDURE — 96374 THER/PROPH/DIAG INJ IV PUSH: CPT

## 2024-06-17 PROCEDURE — 83735 ASSAY OF MAGNESIUM: CPT | Performed by: STUDENT IN AN ORGANIZED HEALTH CARE EDUCATION/TRAINING PROGRAM

## 2024-06-17 PROCEDURE — 83690 ASSAY OF LIPASE: CPT | Performed by: STUDENT IN AN ORGANIZED HEALTH CARE EDUCATION/TRAINING PROGRAM

## 2024-06-17 PROCEDURE — 82248 BILIRUBIN DIRECT: CPT | Performed by: STUDENT IN AN ORGANIZED HEALTH CARE EDUCATION/TRAINING PROGRAM

## 2024-06-17 PROCEDURE — 82274 ASSAY TEST FOR BLOOD FECAL: CPT | Performed by: STUDENT IN AN ORGANIZED HEALTH CARE EDUCATION/TRAINING PROGRAM

## 2024-06-17 RX ORDER — OXYCODONE HYDROCHLORIDE 5 MG/1
1 TABLET ORAL EVERY 6 HOURS PRN
COMMUNITY
Start: 2024-06-11 | End: 2024-07-17

## 2024-06-17 RX ORDER — CYPROHEPTADINE HYDROCHLORIDE 4 MG/1
4 TABLET ORAL AT BEDTIME
COMMUNITY

## 2024-06-17 RX ADMIN — PANTOPRAZOLE SODIUM 40 MG: 40 INJECTION, POWDER, FOR SOLUTION INTRAVENOUS at 18:35

## 2024-06-17 ASSESSMENT — COLUMBIA-SUICIDE SEVERITY RATING SCALE - C-SSRS
6. HAVE YOU EVER DONE ANYTHING, STARTED TO DO ANYTHING, OR PREPARED TO DO ANYTHING TO END YOUR LIFE?: NO
1. IN THE PAST MONTH, HAVE YOU WISHED YOU WERE DEAD OR WISHED YOU COULD GO TO SLEEP AND NOT WAKE UP?: NO
2. HAVE YOU ACTUALLY HAD ANY THOUGHTS OF KILLING YOURSELF IN THE PAST MONTH?: NO

## 2024-06-17 ASSESSMENT — ACTIVITIES OF DAILY LIVING (ADL)
ADLS_ACUITY_SCORE: 35

## 2024-06-17 NOTE — ED NOTES
EKG done at bedside   Ilsa Yusuf's goals for this visit include:   Chief Complaint   Patient presents with     Nose Problem     Nasal obstruction/congestion x4-5 years     She requests these members of her care team be copied on today's visit information: Yes    PCP: Oswald Harris    Referring Provider:  Avila Alva MD  [address]  [place] [address]    BP (!) 147/84 (BP Location: Right arm, Patient Position: Sitting, Cuff Size: Adult Large)   Pulse 83   SpO2 97%     Do you need any medication refills at today's visit? No    Bridget Chiang LPN

## 2024-06-17 NOTE — ED TRIAGE NOTES
Patient was released from [facility] in [place] on Tuesday for his liver and states that over the last 2 days he has become jaundice, having bright red blood in his stool and having abdominal pain. States he is having dizziness when he stands up.      Triage Assessment (Adult)       Row Name 06/17/24 1506          Triage Assessment    Airway WDL WDL        Respiratory WDL    Respiratory WDL WDL        Skin Circulation/Temperature WDL    Skin Circulation/Temperature WDL WDL        Cardiac WDL    Cardiac WDL WDL        Peripheral/Neurovascular WDL    Peripheral Neurovascular WDL WDL        Cognitive/Neuro/Behavioral WDL    Cognitive/Neuro/Behavioral WDL WDL

## 2024-06-17 NOTE — ED PROVIDER NOTES
"[facility]  ED Provider Note    Chief Complaint   Patient presents with    Dizziness    Rectal Bleeding    Jaundice     History:  Carter Ervin is a 33 year old male with history of alcohol dependence alcohol abuse alcohol induced liver disorder Lord's esophagus GERD hyperbilirubinemia who presents to the emergency department today with concern for worsening jaundice and bloody stools.  He says that they are both black and tarry as well as having bright red blood.  He states he was admitted to [facility] on 6/9/2024 after having hematemesis and being found to have elevated bilirubin.  HIDA scan and ultrasound were performed that ruled out biliary obstruction and he was ultimately discharged.  No EGD was performed during this stay.  He states that while he is more yellow he is also more itchy and he would like to get down to the bottom of this.  He denies any alcohol after his discharge.    Review of Systems   Performed; see HPI for pertinent positives and negatives.     Medical history, surgical history, and social history was reviewed.  Nursing documentation, triage note, and vitals were reviewed.    Vitals:  BP: 131/88  Pulse: 89  Temp: 98.9  F (37.2  C)  Resp: 16  Height: 175.3 cm (5' 9\")  Weight: 85.5 kg (188 lb 7.9 oz)  SpO2: 97 %    Physical Exam:  Constitutional: Alert and conversant. NAD   HENT: NCAT   Eyes: Normal pupils scleral icterus  Neck: supple   CV: No pallor  Pulmonary/Chest: Non-labored respirations  Abdominal: non-distended mild epigastric tenderness  MSK: OTERO.   Neuro: Alert and appropriate   Skin: Warm and dry. No diaphoresis. No rashes on exposed skin jaundice  Psych: Appropriate mood and affect       MDM:      ED Course as of 06/17/24 1842 Mon Jun 17, 2024   1835 33-year-old male here in the emergency department concern for ongoing going bloody stools after he had been transferred for hematemesis to [facility] and found to have very elevated bilirubin.  " His bilirubin continues to rise.  It was attributed to nonobstructive causes.  Given the fact that he continues to have bloody stools, I think reasonable to transfer back for rule out of esophageal varices versus other upper versus lower GI bleed etiology   1841 Case discussed with hospitalist at [facility] who has graciously accepted the patient for transfer.  I have given 40 mg of IV Protonix.  No obvious extensive active bleed given hemoglobins appear fairly stable.  Bilirubin however keeps climbing.  If he has worsening symptoms would treat aggressively with octreotide and ceftriaxone.  Will transfer now       Procedures:  Procedures    Impression:  Final diagnoses:   Bloody stool            Dylan Hodge MD  06/17/24 1842

## 2024-06-18 LAB
ATRIAL RATE - MUSE: 82 BPM
DIASTOLIC BLOOD PRESSURE - MUSE: NORMAL MMHG
INTERPRETATION ECG - MUSE: NORMAL
P AXIS - MUSE: 7 DEGREES
PR INTERVAL - MUSE: 126 MS
QRS DURATION - MUSE: 106 MS
QT - MUSE: 380 MS
QTC - MUSE: 443 MS
R AXIS - MUSE: 5 DEGREES
SYSTOLIC BLOOD PRESSURE - MUSE: NORMAL MMHG
T AXIS - MUSE: 13 DEGREES
VENTRICULAR RATE- MUSE: 82 BPM

## 2024-06-18 NOTE — ED NOTES
Call made to [facility] in regards to room. They state that he does have a room, but waiting for them to clean room and they will call when that is done.

## 2024-06-18 NOTE — ED NOTES
Face to face report given with opportunity to observe patient.    Report given to Chito WALSH RN   6/17/2024  7:30 PM

## 2024-07-12 ENCOUNTER — ANCILLARY PROCEDURE (OUTPATIENT)
Dept: MRI IMAGING | Facility: CLINIC | Age: 34
End: 2024-07-12
Attending: PSYCHIATRY & NEUROLOGY
Payer: COMMERCIAL

## 2024-07-12 PROCEDURE — 70553 MRI BRAIN STEM W/O & W/DYE: CPT | Performed by: RADIOLOGY

## 2024-07-12 PROCEDURE — A9585 GADOBUTROL INJECTION: HCPCS | Performed by: RADIOLOGY

## 2024-07-12 RX ORDER — GADOBUTROL 604.72 MG/ML
10 INJECTION INTRAVENOUS ONCE
Status: COMPLETED | OUTPATIENT
Start: 2024-07-12 | End: 2024-07-12

## 2024-07-12 RX ADMIN — GADOBUTROL 8.5 ML: 604.72 INJECTION INTRAVENOUS at 18:05

## 2024-07-17 ENCOUNTER — OFFICE VISIT (OUTPATIENT)
Dept: PSYCHIATRY | Facility: OTHER | Age: 34
End: 2024-07-17
Payer: COMMERCIAL

## 2024-07-17 VITALS
WEIGHT: 200 LBS | SYSTOLIC BLOOD PRESSURE: 138 MMHG | OXYGEN SATURATION: 98 % | HEART RATE: 87 BPM | BODY MASS INDEX: 29.62 KG/M2 | TEMPERATURE: 99.1 F | HEIGHT: 69 IN | DIASTOLIC BLOOD PRESSURE: 74 MMHG

## 2024-07-17 DIAGNOSIS — F41.1 GAD (GENERALIZED ANXIETY DISORDER): ICD-10-CM

## 2024-07-17 DIAGNOSIS — F33.1 MAJOR DEPRESSIVE DISORDER, RECURRENT EPISODE, MODERATE (H): Primary | ICD-10-CM

## 2024-07-17 DIAGNOSIS — F10.21 ALCOHOL USE DISORDER, SEVERE, IN EARLY REMISSION (H): ICD-10-CM

## 2024-07-17 PROCEDURE — 99214 OFFICE O/P EST MOD 30 MIN: CPT

## 2024-07-17 RX ORDER — CITALOPRAM HYDROBROMIDE 10 MG/1
10 TABLET ORAL DAILY
Qty: 7 TABLET | Refills: 0 | Status: SHIPPED | OUTPATIENT
Start: 2024-07-17 | End: 2024-08-23

## 2024-07-17 RX ORDER — VENLAFAXINE HYDROCHLORIDE 37.5 MG/1
CAPSULE, EXTENDED RELEASE ORAL
Qty: 42 CAPSULE | Refills: 0 | Status: SHIPPED | OUTPATIENT
Start: 2024-07-17 | End: 2024-08-23

## 2024-07-17 RX ORDER — CITALOPRAM HYDROBROMIDE 20 MG/1
20 TABLET ORAL DAILY
Qty: 7 TABLET | Refills: 0 | Status: SHIPPED | OUTPATIENT
Start: 2024-07-17 | End: 2024-08-23

## 2024-07-17 RX ORDER — GABAPENTIN 100 MG/1
200 CAPSULE ORAL 3 TIMES DAILY
Qty: 180 CAPSULE | Refills: 1 | Status: SHIPPED | OUTPATIENT
Start: 2024-07-17 | End: 2024-08-23

## 2024-07-17 ASSESSMENT — PATIENT HEALTH QUESTIONNAIRE - PHQ9
10. IF YOU CHECKED OFF ANY PROBLEMS, HOW DIFFICULT HAVE THESE PROBLEMS MADE IT FOR YOU TO DO YOUR WORK, TAKE CARE OF THINGS AT HOME, OR GET ALONG WITH OTHER PEOPLE: SOMEWHAT DIFFICULT
SUM OF ALL RESPONSES TO PHQ QUESTIONS 1-9: 10
SUM OF ALL RESPONSES TO PHQ QUESTIONS 1-9: 10

## 2024-07-17 ASSESSMENT — ANXIETY QUESTIONNAIRES
6. BECOMING EASILY ANNOYED OR IRRITABLE: SEVERAL DAYS
4. TROUBLE RELAXING: MORE THAN HALF THE DAYS
5. BEING SO RESTLESS THAT IT IS HARD TO SIT STILL: NOT AT ALL
GAD7 TOTAL SCORE: 12
7. FEELING AFRAID AS IF SOMETHING AWFUL MIGHT HAPPEN: SEVERAL DAYS
3. WORRYING TOO MUCH ABOUT DIFFERENT THINGS: NEARLY EVERY DAY
GAD7 TOTAL SCORE: 12
GAD7 TOTAL SCORE: 12
1. FEELING NERVOUS, ANXIOUS, OR ON EDGE: MORE THAN HALF THE DAYS
7. FEELING AFRAID AS IF SOMETHING AWFUL MIGHT HAPPEN: SEVERAL DAYS
IF YOU CHECKED OFF ANY PROBLEMS ON THIS QUESTIONNAIRE, HOW DIFFICULT HAVE THESE PROBLEMS MADE IT FOR YOU TO DO YOUR WORK, TAKE CARE OF THINGS AT HOME, OR GET ALONG WITH OTHER PEOPLE: SOMEWHAT DIFFICULT
8. IF YOU CHECKED OFF ANY PROBLEMS, HOW DIFFICULT HAVE THESE MADE IT FOR YOU TO DO YOUR WORK, TAKE CARE OF THINGS AT HOME, OR GET ALONG WITH OTHER PEOPLE?: SOMEWHAT DIFFICULT
2. NOT BEING ABLE TO STOP OR CONTROL WORRYING: NEARLY EVERY DAY

## 2024-07-17 ASSESSMENT — PAIN SCALES - GENERAL: PAINLEVEL: NO PAIN (0)

## 2024-07-17 NOTE — PROGRESS NOTES
OUTPATIENT PSYCHIATRY: FOLLOW UP ASSESSMENT (ADULT)     Identifying Information:  Carter Ervin MRN# [id number]   Age: 34 year old YOB: 1990     Initial Psychiatry Visit Date: 6/12/24        Assessment & Plan     This is a 34 year old male patient who is seen today for follow up.    (F10.21) Alcohol use disorder, severe, in early remission (H)  (primary encounter diagnosis)  Comment: Carter quit taking naltrexone. He states he wasn't having any cravings. He is usually drinking one beer with a meal a day.  Plan: Denies needing treatment, Encouraged sobriety     (F41.1) FAY (generalized anxiety disorder)  Comment: Anxiety is bad without alcohol overuse. Carter states he has been on the Celexa for many years and is wondering if it is even helping anymore. He is open to a change. Suggested Effexor for a little more activation. We discussed the addition of venlafaxine (Effexor XR) to target depression and anxiety. Educated on the risks, benefits and side effects including insomnia, dizziness, nausea, diarrhea, decreased appetite, constipation, dry mouth, sexual dysfunction, sweating, sedation, headache, nervousness. Patient consents to treatment. Taper schedule given for Celexa. Gabapentin was also increased.  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 Changing to Effexor as discussed above for anxiety.  Plan: venlafaxine (EFFEXOR XR) 37.5 MG 24 hr capsule Take 1 capsule (37.5 mg) by mouth daily for 14 days, THEN 2 capsules (75 mg) daily for 14 days    Laboratory: None    Referrals: None    Follow Up: Return for follow up in  "1 month          History of Present Illness     Carter Ervin is a 34 year old male with a reported history of alcohol use disorder, FAY, and MDD who is here today for follow up. Carter attended the session alone. Carter was last seen on 6/12/24 when we started naltrexone, hydroxyzine, and gabapentin.  Hydroxyzine - made him really nauseas, stopped taking, stopped it  Naltrexone - didn't really need it. Has a beer with a meal. Having a beer not daily, maybe with dinner, but not every day. Not having any cravings.  With beer he felt like actually doing stuff, and now he just doesn't feel like doing anything, no motivation.  Someone at [facility] started him on Trazodone and that is helping. Taking 50 mg - 1.5 tablets (75 mg) sleep is pretty decent, still pretty tired in the morning.   Working full time         Psychiatric Review of Symptoms     Mood/Depression: down, Endorses depressed mood, low energy, insomnia, poor concentration /memory, and overwhelmed, loss of interest in activities.     Anxiety: \"Bad\". Brain won't shut off. Low on energy, doesn't really feel like doing anything. Excessive worry, not really anything specific, beer helped with social anxiety (social butterfly if he was drinking); ok with crowds of people, but not good with talking to people.     Panic Attacks: Denies panic attacks.     Sleep: trouble falling asleep and trouble staying asleep. Was taking cyproheptadine for about a month; then took trazodone but that stopped working. Someone at [facility] has put him back on Trazodone, and he says now it is working.     Appetite: Poor - been that way for quite a while; not feeling hungry.      Concentration/Distractibility: a little bit off; able to get tasks done at work, but does go back and double and triple check. Just   Does that with other parts of his life too. Checking to make sure things are right    Activity/Energy: With beer he felt like actually doing stuff, and now he just doesn't feel " "like doing anything, no motivation.    Current psychosocial stressors: medical issues health issues, mental health symptoms, and occupational / vocational stress    Substance Use: No change. Current use includes: alcohol, smoking    Suicidal Ideation: Denies suicidal ideation or self-harm    Homicidal Ideation: Denies homicidal ideation    Therapy: Not planning to continue therapy         Past Medical/Surgical History     Medical diagnoses: History reviewed. No pertinent past medical history.    Surgical history: No past surgical history on file.         Medical Review of Systems     Allergies: Morphine          Vital Signs: /74 (Cuff Size: Adult Regular)   Pulse 87   Temp 99.1  F (37.3  C) (Tympanic)   Ht 1.753 m (5' 9\")   Wt 90.7 kg (200 lb)   SpO2 98%   BMI 29.53 kg/m            Weight: 200 lbs 0 oz  BMI: Body mass index is 29.53 kg/m .    Physical Exam: Please refer to physical exam completed by primary care provider.    10 point review of systems is otherwise negative unless noted above.           Mental Status Examination     Appearance:  awake, alert, adequately groomed, and appeared as age stated  Alertness:  alert  and oriented  Attitude:  cooperative  Behavior/Demeanor:  cooperative, pleasant, and guarded, with good  eye contact.  Mood:  good and was congruent to speech content.  Affect:  appropriate and in normal range, mood congruent, and intensity is normal  Speech:  regular rate and rhythm  Psychomotor Behavior:  no evidence of tardive dyskinesia, dystonia, or tics and intact station, gait and muscle tone  Thought Process:  logical, linear, and goal oriented without any evidence of loose associations, flight of ideas, tangentiality, or circumstantiality.  Thought Content:  no evidence of suicidal ideation or homicidal ideation, no evidence of psychotic thought, no auditory hallucinations present, and no visual hallucinations present  Insight:  good  Judgment: good  Cognition:  time, person, " and place  Attention Span and Concentration:  intact  Recent and Remote Memory:  intact  Language:  no obvious problem  Fund of Knowledge: appropriate  Muscle Strength and Tone: normal  Gait and Station: Normal     These cognitive functions grossly appear as described, but were not formally tested.         Labs     No results found for this or any previous visit (from the past 24 hour(s)).    Labs were reviewed, no concerns.         Medications                                                                  BOLD psych meds     I have reviewed this patient's current medications.    Current Outpatient Medications   Medication Sig Dispense Refill    citalopram (CELEXA) 40 MG tablet Take 1 tablet (40 mg) by mouth daily 30 tablet 2    cyproheptadine (PERIACTIN) 4 MG tablet Take 4 mg by mouth at bedtime      gabapentin (NEURONTIN) 100 MG capsule Take 1 capsule (100 mg) by mouth 3 times daily 90 capsule 2    hydrOXYzine HCl (ATARAX) 25 MG tablet Take 1-2 tablets (25-50 mg) by mouth 3 times daily as needed for anxiety 180 tablet 2    lisinopril (ZESTRIL) 10 MG tablet TAKE 1 TABLET (10 MG) BY MOUTH ONCE DAILY.      naltrexone (DEPADE/REVIA) 50 MG tablet Take 1 tablet (50 mg) by mouth daily 30 tablet 2    omeprazole (PRILOSEC) 20 MG DR capsule TAKE 1 CAPSULE (20 MG) BY MOUTH ONCE DAILY ON AN EMPTY STOMACH.      oxyCODONE (ROXICODONE) 5 MG tablet Take 1 tablet by mouth every 6 hours as needed       No current facility-administered medications for this visit.     Reviewed PDMP: N/A     Past medication trials:   Buspar (21-23)  Celexa (21-22)  Trazodone           PHQ-9 / FAY-7                       Reviewed PHQ-9 and FAY-7 screenings.         5/23/2024     9:54 AM 6/12/2024    12:06 PM 7/17/2024     1:24 PM   PHQ-9 SCORE   PHQ-9 Total Score MyChart  9 (Mild depression) 10 (Moderate depression)   PHQ-9 Total Score 11 9 10         5/23/2024     9:54 AM 6/12/2024    12:07 PM 7/17/2024     1:25 PM   FAY-7 SCORE   Total Score  13  (moderate anxiety) 12 (moderate anxiety)   Total Score 12 13 12     Depression Screening Follow-up  Does the patient currently have a mental health provider?  Yes, patient was referred back to current mental health provider.  CLAIRE Moreno CNP             30 minutes spent by me on the date of the encounter doing chart review, patient visit, and documentation     CLAIRE Finney, PMHNP-BC    Patient was instructed to monitor for worsening symptoms and side effects from medications. If there is a serious deterioration of mood or any dangerous-type behaviors (suicidal/homicidal ideations) patient is advised to call 911 or report directly to the nearest Emergency Department.     Treatment Risk Statement: The risks, benefits, alternatives and potential adverse effects have been explained and are understood by the patient. The patient agrees to the treatment plan with the ability to do so. The patient knows to call the clinic for any problems or access emergency care if needed.

## 2024-07-17 NOTE — PATIENT INSTRUCTIONS
- Reduce Celexa to 20 mg for 7 days, then 10 mg for 7 days, then stop  - Start Effexor XR 37.5 mg daily for 14 days, then increase to 75 mg daily  - Increase Gabapentin 200 mg three times daily  - Return to clinic in 1 month for follow up  Thank you for allowing Nadine Fofana, VICENTA, APRN to participate in your care.  If you have a scheduling or an appointment question please contact our scheduling department at their direct line [phone].   ALL nursing questions or concerns can be directed to the psychiatry nurses at [phone] or [phone]

## 2024-08-23 ENCOUNTER — OFFICE VISIT (OUTPATIENT)
Dept: PSYCHIATRY | Facility: OTHER | Age: 34
End: 2024-08-23
Payer: COMMERCIAL

## 2024-08-23 VITALS
WEIGHT: 200 LBS | HEART RATE: 91 BPM | BODY MASS INDEX: 29.62 KG/M2 | DIASTOLIC BLOOD PRESSURE: 78 MMHG | SYSTOLIC BLOOD PRESSURE: 134 MMHG | TEMPERATURE: 98.1 F | HEIGHT: 69 IN | OXYGEN SATURATION: 99 %

## 2024-08-23 DIAGNOSIS — F33.1 MAJOR DEPRESSIVE DISORDER, RECURRENT EPISODE, MODERATE (H): ICD-10-CM

## 2024-08-23 DIAGNOSIS — F10.21 ALCOHOL USE DISORDER, SEVERE, IN EARLY REMISSION (H): ICD-10-CM

## 2024-08-23 DIAGNOSIS — F41.1 GAD (GENERALIZED ANXIETY DISORDER): Primary | ICD-10-CM

## 2024-08-23 PROCEDURE — 99214 OFFICE O/P EST MOD 30 MIN: CPT

## 2024-08-23 RX ORDER — VENLAFAXINE HYDROCHLORIDE 37.5 MG/1
37.5 CAPSULE, EXTENDED RELEASE ORAL DAILY
Qty: 30 CAPSULE | Refills: 2 | Status: SHIPPED | OUTPATIENT
Start: 2024-08-23

## 2024-08-23 RX ORDER — GABAPENTIN 300 MG/1
300 CAPSULE ORAL 3 TIMES DAILY
Qty: 90 CAPSULE | Refills: 2 | Status: SHIPPED | OUTPATIENT
Start: 2024-08-23

## 2024-08-23 RX ORDER — VENLAFAXINE HYDROCHLORIDE 75 MG/1
75 CAPSULE, EXTENDED RELEASE ORAL DAILY
Qty: 30 CAPSULE | Refills: 2 | Status: SHIPPED | OUTPATIENT
Start: 2024-08-23

## 2024-08-23 ASSESSMENT — PATIENT HEALTH QUESTIONNAIRE - PHQ9
SUM OF ALL RESPONSES TO PHQ QUESTIONS 1-9: 6
10. IF YOU CHECKED OFF ANY PROBLEMS, HOW DIFFICULT HAVE THESE PROBLEMS MADE IT FOR YOU TO DO YOUR WORK, TAKE CARE OF THINGS AT HOME, OR GET ALONG WITH OTHER PEOPLE: SOMEWHAT DIFFICULT
SUM OF ALL RESPONSES TO PHQ QUESTIONS 1-9: 6

## 2024-08-23 ASSESSMENT — PAIN SCALES - GENERAL: PAINLEVEL: NO PAIN (0)

## 2024-08-23 ASSESSMENT — ANXIETY QUESTIONNAIRES
4. TROUBLE RELAXING: SEVERAL DAYS
7. FEELING AFRAID AS IF SOMETHING AWFUL MIGHT HAPPEN: SEVERAL DAYS
6. BECOMING EASILY ANNOYED OR IRRITABLE: SEVERAL DAYS
8. IF YOU CHECKED OFF ANY PROBLEMS, HOW DIFFICULT HAVE THESE MADE IT FOR YOU TO DO YOUR WORK, TAKE CARE OF THINGS AT HOME, OR GET ALONG WITH OTHER PEOPLE?: SOMEWHAT DIFFICULT
IF YOU CHECKED OFF ANY PROBLEMS ON THIS QUESTIONNAIRE, HOW DIFFICULT HAVE THESE PROBLEMS MADE IT FOR YOU TO DO YOUR WORK, TAKE CARE OF THINGS AT HOME, OR GET ALONG WITH OTHER PEOPLE: SOMEWHAT DIFFICULT
GAD7 TOTAL SCORE: 11
5. BEING SO RESTLESS THAT IT IS HARD TO SIT STILL: NOT AT ALL
GAD7 TOTAL SCORE: 11
7. FEELING AFRAID AS IF SOMETHING AWFUL MIGHT HAPPEN: SEVERAL DAYS
GAD7 TOTAL SCORE: 11
1. FEELING NERVOUS, ANXIOUS, OR ON EDGE: MORE THAN HALF THE DAYS
3. WORRYING TOO MUCH ABOUT DIFFERENT THINGS: NEARLY EVERY DAY
2. NOT BEING ABLE TO STOP OR CONTROL WORRYING: NEARLY EVERY DAY

## 2024-08-23 NOTE — PATIENT INSTRUCTIONS
- Increase Effexor to a total daily dose of 112.5 - Take one 37.5 mg capsule along with a 75 mg capsule daily  - Increase gabapentin to 300 mg three times daily   - Follow up 6 weeks  Thank you for allowing Nadine Fofana DNP, APRN to participate in your care.  If you have a scheduling or an appointment question please contact our scheduling department at their direct line [phone].   ALL nursing questions or concerns can be directed to the psychiatry nurses at [phone] or [phone]

## 2024-08-23 NOTE — PROGRESS NOTES
"          OUTPATIENT PSYCHIATRY: FOLLOW UP ASSESSMENT (ADULT)     Identifying Information:  Carter Ervin MRN# [id number]   Age: 34 year old YOB: 1990     Initial Psychiatry Visit Date: 6/12/24        Assessment & Plan     This is a 34 year old male patient who is seen today for follow up. Carter states the switch to Effexor has been good. He has noticed more help with the anxiety, but could still be improved. Francis recommends max dose of venlafaxine is 1/2 dose max for hepatic impairment (112.2 mg). Increase Effexor to a total daily dose of 112.5 - Take one 37.5 mg capsule along with a 75 mg capsule daily. We will also increase gabapentin to 300 mg three times daily to target anxiety symptoms.    (F10.21) Alcohol use disorder, severe, in early remission (H)    Comment: Previously drinking one beer with a meal a day, now stats he is drinking \"a couple\" with dinner.  Plan: Denies needing treatment, encouraged sobriety     (F41.1) FAY (generalized anxiety disorder) (primary encounter diagnosis)  Plan: venlafaxine (EFFEXOR XR) 37.5 MG 24 hr capsule Take 1 capsule (37.5 mg) by mouth daily. Take along with 75 mg capsule for a total daily dose of 112.5 mg.   venlafaxine (EFFEXOR XR) 75 MG 24 hr capsule Take 1 capsule (75 mg) by mouth daily. Take along with 37.5 mg capsule for a total daily dose of 112.5 mg.   gabapentin (NEURONTIN) 300 MG capsule Take 1 capsule (300 mg) by mouth 3 times daily.      (F33.1) Major depressive disorder, recurrent episode, moderate (H)  Plan: venlafaxine (EFFEXOR XR) 37.5 MG 24 hr capsule Take 1 capsule (37.5 mg) by mouth daily. Take along with 75 mg capsule for a total daily dose of 112.5 mg.   venlafaxine (EFFEXOR XR) 75 MG 24 hr capsule Take 1 capsule (75 mg) by mouth daily. Take along with 37.5 mg capsule for a total daily dose of 112.5 mg.     Laboratory: None    Referrals: None    Follow Up: Return for follow up in 6 weeks          History of Present Illness     Carter" "MULUGETA Ervin is a 34 year old male with a reported history of alcohol use disorder, FAY, and MDD who is here today for follow up. Carter attended the session alone. Carter was last seen on 7/17/24 when Effexor was started and Celexa was stopped. Carter states he is doing \"good\". The Effexor seems to be helping a little bit. The switch from Celexa to Effexor went well.   Drinking a couple beers with dinner.         Psychiatric Review of Symptoms     Mood/Depression: a little improvement in mood. Endorses depressed mood, low energy, insomnia, poor concentration /memory, and overwhelmed, loss of interest in activities.     Anxiety: anxiety is still pretty high. Worry about everything Brain won't shut off. Low on energy, doesn't really feel like doing anything. Excessive worry, not really anything specific, beer helped with social anxiety (social butterfly if he was drinking); ok with crowds of people, but not good with talking to people.     Panic Attacks: Denies panic attacks.     Sleep: Good - no trouble falling asleep. Trazodone 50 mg - taking 1.5 tabs - [facility] at [facility] is prescribing that.    Appetite: Good, better than it was    Concentration/Distractibility: a little bit off; able to get tasks done at work, but does go back and double and triple check. Just   Does that with other parts of his life too. Checking to make sure things are right     Activity/Energy: With beer he felt like actually doing stuff, and now he just doesn't feel like doing anything, no motivation.     Current psychosocial stressors: medical issues health issues, mental health symptoms, and occupational / vocational stress     Substance Use: No change. Current use includes: alcohol, smoking    Suicidal Ideation: Denies suicidal ideation or self-harm    Homicidal Ideation: Denies homicidal ideation    Therapy: Not seeing anyone for therapy.         Past Medical/Surgical History     Medical diagnoses: History reviewed. No pertinent past " "medical history.    Surgical history: No past surgical history on file.         Medical Review of Systems     Allergies: Morphine          Vital Signs: /78 (Cuff Size: Adult Large)   Pulse 91   Temp 98.1  F (36.7  C) (Tympanic)   Ht 1.753 m (5' 9\")   Wt 90.7 kg (200 lb)   SpO2 99%   BMI 29.53 kg/m            Weight: 200 lbs 0 oz  BMI: Body mass index is 29.53 kg/m .    Physical Exam: Please refer to physical exam completed by primary care provider.    10 point review of systems is otherwise negative unless noted above.           Mental Status Examination     Appearance:  awake, alert, adequately groomed, and appeared as age stated  Alertness:  alert  and oriented  Attitude:  cooperative  Behavior/Demeanor:  cooperative, pleasant, and guarded, with good  eye contact.  Mood:  good and was congruent to speech content.  Affect:  appropriate and in normal range, mood congruent, and intensity is normal  Speech:  regular rate and rhythm  Psychomotor Behavior:  no evidence of tardive dyskinesia, dystonia, or tics and intact station, gait and muscle tone  Thought Process:  logical, linear, and goal oriented without any evidence of loose associations, flight of ideas, tangentiality, or circumstantiality.  Thought Content:  no evidence of suicidal ideation or homicidal ideation, no evidence of psychotic thought, no auditory hallucinations present, and no visual hallucinations present  Insight:  good  Judgment: good  Cognition:  time, person, and place  Attention Span and Concentration:  intact  Recent and Remote Memory:  intact  Language:  no obvious problem  Fund of Knowledge: appropriate  Muscle Strength and Tone: normal  Gait and Station: Normal     These cognitive functions grossly appear as described, but were not formally tested.         Labs     No results found for this or any previous visit (from the past 24 hour(s)).    Labs were reviewed, no concerns.         Medications                                   "                                BOLD psych meds     I have reviewed this patient's current medications.    Current Outpatient Medications   Medication Sig Dispense Refill    cyproheptadine (PERIACTIN) 4 MG tablet Take 4 mg by mouth at bedtime      gabapentin (NEURONTIN) 100 MG capsule Take 2 capsules (200 mg) by mouth 3 times daily 180 capsule 1    omeprazole (PRILOSEC) 20 MG DR capsule TAKE 1 CAPSULE (20 MG) BY MOUTH ONCE DAILY ON AN EMPTY STOMACH.      lisinopril (ZESTRIL) 10 MG tablet TAKE 1 TABLET (10 MG) BY MOUTH ONCE DAILY. (Patient not taking: Reported on 8/23/2024)      venlafaxine (EFFEXOR XR) 37.5 MG 24 hr capsule Take 1 capsule (37.5 mg) by mouth daily for 14 days, THEN 2 capsules (75 mg) daily for 14 days. 42 capsule 0     No current facility-administered medications for this visit.     Reviewed PDMP: N/A     Past medication trials:   Buspar (21-23)  Celexa (21-22)  Trazodone            PHQ-9 / FAY-7                       Reviewed PHQ-9 and FAY-7 screenings.         6/12/2024    12:06 PM 7/17/2024     1:24 PM 8/23/2024    12:18 PM   PHQ-9 SCORE   PHQ-9 Total Score MyChart 9 (Mild depression) 10 (Moderate depression) 6 (Mild depression)   PHQ-9 Total Score 9 10 6         6/12/2024    12:07 PM 7/17/2024     1:25 PM 8/23/2024    12:19 PM   FAY-7 SCORE   Total Score 13 (moderate anxiety) 12 (moderate anxiety) 11 (moderate anxiety)   Total Score 13 12 11              Level of Medical Decision Making:   - At least 2 stable chronic problems  - Engaged in prescription drug management during visit (discussed any medication benefits, side effects, alternatives, etc.)     CLAIRE Finney, PMHNP-BC    Patient was instructed to monitor for worsening symptoms and side effects from medications. If there is a serious deterioration of mood or any dangerous-type behaviors (suicidal/homicidal ideations) patient is advised to call 911 or report directly to the nearest Emergency Department.     Treatment Risk Statement:  The risks, benefits, alternatives and potential adverse effects have been explained and are understood by the patient. The patient agrees to the treatment plan with the ability to do so. The patient knows to call the clinic for any problems or access emergency care if needed.

## 2024-10-09 ENCOUNTER — VIRTUAL VISIT (OUTPATIENT)
Dept: NEUROLOGY | Facility: CLINIC | Age: 34
End: 2024-10-09
Payer: COMMERCIAL

## 2024-10-09 ENCOUNTER — VIRTUAL VISIT (OUTPATIENT)
Dept: PSYCHIATRY | Facility: OTHER | Age: 34
End: 2024-10-09
Payer: COMMERCIAL

## 2024-10-09 VITALS — HEIGHT: 69 IN | WEIGHT: 200 LBS | BODY MASS INDEX: 29.62 KG/M2

## 2024-10-09 DIAGNOSIS — G40.909 SEIZURE DISORDER (H): Primary | ICD-10-CM

## 2024-10-09 DIAGNOSIS — F33.1 MAJOR DEPRESSIVE DISORDER, RECURRENT EPISODE, MODERATE (H): ICD-10-CM

## 2024-10-09 DIAGNOSIS — F10.20 ALCOHOL USE DISORDER, MODERATE, DEPENDENCE (H): ICD-10-CM

## 2024-10-09 DIAGNOSIS — F41.1 GAD (GENERALIZED ANXIETY DISORDER): Primary | ICD-10-CM

## 2024-10-09 PROCEDURE — 99442 PR PHYSICIAN TELEPHONE EVALUATION 11-20 MIN: CPT | Mod: 93

## 2024-10-09 ASSESSMENT — ANXIETY QUESTIONNAIRES
GAD7 TOTAL SCORE: 7
8. IF YOU CHECKED OFF ANY PROBLEMS, HOW DIFFICULT HAVE THESE MADE IT FOR YOU TO DO YOUR WORK, TAKE CARE OF THINGS AT HOME, OR GET ALONG WITH OTHER PEOPLE?: SOMEWHAT DIFFICULT
7. FEELING AFRAID AS IF SOMETHING AWFUL MIGHT HAPPEN: NOT AT ALL
6. BECOMING EASILY ANNOYED OR IRRITABLE: SEVERAL DAYS
5. BEING SO RESTLESS THAT IT IS HARD TO SIT STILL: NOT AT ALL
GAD7 TOTAL SCORE: 7
3. WORRYING TOO MUCH ABOUT DIFFERENT THINGS: MORE THAN HALF THE DAYS
GAD7 TOTAL SCORE: 7
1. FEELING NERVOUS, ANXIOUS, OR ON EDGE: SEVERAL DAYS
IF YOU CHECKED OFF ANY PROBLEMS ON THIS QUESTIONNAIRE, HOW DIFFICULT HAVE THESE PROBLEMS MADE IT FOR YOU TO DO YOUR WORK, TAKE CARE OF THINGS AT HOME, OR GET ALONG WITH OTHER PEOPLE: SOMEWHAT DIFFICULT
7. FEELING AFRAID AS IF SOMETHING AWFUL MIGHT HAPPEN: NOT AT ALL
2. NOT BEING ABLE TO STOP OR CONTROL WORRYING: MORE THAN HALF THE DAYS
4. TROUBLE RELAXING: SEVERAL DAYS

## 2024-10-09 ASSESSMENT — PAIN SCALES - GENERAL: PAINLEVEL: NO PAIN (0)

## 2024-10-09 ASSESSMENT — PATIENT HEALTH QUESTIONNAIRE - PHQ9
SUM OF ALL RESPONSES TO PHQ QUESTIONS 1-9: 5
10. IF YOU CHECKED OFF ANY PROBLEMS, HOW DIFFICULT HAVE THESE PROBLEMS MADE IT FOR YOU TO DO YOUR WORK, TAKE CARE OF THINGS AT HOME, OR GET ALONG WITH OTHER PEOPLE: SOMEWHAT DIFFICULT
SUM OF ALL RESPONSES TO PHQ QUESTIONS 1-9: 5

## 2024-10-09 NOTE — LETTER
10/9/2024       RE: Carter Ervin  : 1990   MRN: [id number]      Dear Colleague,    Thank you for referring your patient, Carter Ervin, to the [facility] at [facility]. Please see a copy of my visit note below.    Carter is a 34 year old who is being evaluated via a billable video visit.    How would you like to obtain your AVS? MyChart  If the video visit is dropped, the invitation should be resent by: Text to cell phone: [phone]  Will anyone else be joining your video visit? No          Video-Visit Details    Type of service:  Video Visit   Originating Location (pt. Location): Other car    Distant Location (provider location):  On-site  Platform used for Video Visit: Well  Signed Electronically by: Vincenzo Singh MD             CHIEF COMPLAINT:  Seizures.    HISTORY OF PRESENT ILLNESS:  Video call for follow up. This patient is a 33 year old years old right handed male presented for evaluation for seizure like activities.  Patient is by himself during the video visit today.    Patient was seen about 4 months ago. He presented with 2 seizures likely secondary to alcohol withdrawal. He needed to be cleared for work. Since the last visit, he had no seizures. No complaints. He had MRI brain in July which was normal. He is back to work. He is now drinking 1-2 beers a day. No issues.    Patient had history of Moderate episode of recurrent major depressive disorder, Anxiety disorder, Essential hypertension and Lord's esophagus without dysplasia. In 2024, he was at work, and was found unresponsive and confused at 3pm. He was found on the ground. No one witnessed how he went down to the ground. He woke up and was not coherent. He had no clear postictal confusion or fatigue. He did not remember anything about the spell. No clear triggers. He had tongue    He did not eat much for about 2 days. He usually drinks 10-11 beers  daily. He probably had about 8 beers the day before the ED, and had no drink the day of the ED.    He was sent to ED. He had a head CT which was normal. All other tests were reported normal, although he can not remember what tests were done.    He has another similar event in Aug. 2023. He was sent to the ED. This was considered an alcohol withdrawal seizure.    TRIGGERS FOR SEIZURES:    ETOH withdrawal?    RISK FACTORS FOR SEIZURES:  No history of head trauma with loss of consciousness, no history of CNS infection, no history of febrile convulsions.  No history of brain tumor or stroke. Normal development.     ALLERGIES:  Morphine    CURRENT MEDICATIONS:    Current Outpatient Medications   Medication Sig Dispense Refill     cyproheptadine (PERIACTIN) 4 MG tablet Take 4 mg by mouth at bedtime       gabapentin (NEURONTIN) 300 MG capsule Take 1 capsule (300 mg) by mouth 3 times daily. 90 capsule 2     omeprazole (PRILOSEC) 20 MG DR capsule TAKE 1 CAPSULE (20 MG) BY MOUTH ONCE DAILY ON AN EMPTY STOMACH.       venlafaxine (EFFEXOR XR) 37.5 MG 24 hr capsule Take 1 capsule (37.5 mg) by mouth daily. Take along with 75 mg capsule for a total daily dose of 112.5 mg. 30 capsule 2     venlafaxine (EFFEXOR XR) 75 MG 24 hr capsule Take 1 capsule (75 mg) by mouth daily. Take along with 37.5 mg capsule for a total daily dose of 112.5 mg. 30 capsule 2     lisinopril (ZESTRIL) 10 MG tablet TAKE 1 TABLET (10 MG) BY MOUTH ONCE DAILY. (Patient not taking: Reported on 8/23/2024)       PAST AEDs:  None    PAST MEDICAL HISTORY:  Depression  Anxiety  Acid reflux  HTN    PAST SURGICAL HISTORY:  Knee surgery reconstructive 2009  Finger graft/seperatioin    Family History:  History of seizures:   None    SOCIAL HISTORY:  Born and raised: [place]   Education: High school, regular classes  Work:   Tobacco: None, ETOH: 10-11 beers a day, Drugs: Occasional Marijuana    Family: , Children: None    REVIEW OF SYSTEMS:    Depression and anxiety  Sleep problems.       PHYSICAL EXAMINATIONS:       AAO x 3, speech fluent and appropriate. Hearing normal. No facial weakness.    PREVIOUS DIAGNOSTIC TESTING:    MRI brain: 2024  Normal.    CT head: Normal.    EE2024  Normal      IMPRESSION:       ETOH withdrawal seizures.     Patient was seen about 4 months ago. He presented with 2 seizures likely secondary to alcohol withdrawal. He needed to be cleared for work. Since the last visit, he had no seizures. No complaints. He had MRI brain in July which was normal. He is back to work. He is now drinking 1-2 beers a day. No issues.      PLAN:    No AEDs are indicated at this time.  Patient may go back to work with no restriction. He is working on stopping drinking.  RTC in PRN.      17 min total time was spent on the day of this visit.      8 min was spent on face to face time  9 min was spent on preparation of visit to review charts and labs, ordering medications and tests, and documentation of clinical information      Again, thank you for allowing me to participate in the care of your patient.      Sincerely,    Vincenzo Singh MD

## 2024-10-09 NOTE — PROGRESS NOTES
Carter is a 34 year old who is being evaluated via a billable video visit.    How would you like to obtain your AVS? MyChart  If the video visit is dropped, the invitation should be resent by: Text to cell phone: [phone]  Will anyone else be joining your video visit? No          Video-Visit Details    Type of service:  Video Visit   Originating Location (pt. Location): Other car    Distant Location (provider location):  On-site  Platform used for Video Visit: [facility]  Signed Electronically by: Vincenzo Singh MD             CHIEF COMPLAINT:  Seizures.    HISTORY OF PRESENT ILLNESS:  Video call for follow up. This patient is a 33 year old years old right handed male presented for evaluation for seizure like activities.  Patient is by himself during the video visit today.    Patient was seen about 4 months ago. He presented with 2 seizures likely secondary to alcohol withdrawal. He needed to be cleared for work. Since the last visit, he had no seizures. No complaints. He had MRI brain in July which was normal. He is back to work. He is now drinking 1-2 beers a day. No issues.    Patient had history of Moderate episode of recurrent major depressive disorder, Anxiety disorder, Essential hypertension and Lord's esophagus without dysplasia. In March 2024, he was at work, and was found unresponsive and confused at 3pm. He was found on the ground. No one witnessed how he went down to the ground. He woke up and was not coherent. He had no clear postictal confusion or fatigue. He did not remember anything about the spell. No clear triggers. He had tongue    He did not eat much for about 2 days. He usually drinks 10-11 beers daily. He probably had about 8 beers the day before the ED, and had no drink the day of the ED.    He was sent to ED. He had a head CT which was normal. All other tests were reported normal, although he can not remember what tests were done.    He has another similar event in Aug. 2023. He was sent to  the ED. This was considered an alcohol withdrawal seizure.    TRIGGERS FOR SEIZURES:    ETOH withdrawal?    RISK FACTORS FOR SEIZURES:  No history of head trauma with loss of consciousness, no history of CNS infection, no history of febrile convulsions.  No history of brain tumor or stroke. Normal development.     ALLERGIES:  Morphine    CURRENT MEDICATIONS:    Current Outpatient Medications   Medication Sig Dispense Refill    cyproheptadine (PERIACTIN) 4 MG tablet Take 4 mg by mouth at bedtime      gabapentin (NEURONTIN) 300 MG capsule Take 1 capsule (300 mg) by mouth 3 times daily. 90 capsule 2    omeprazole (PRILOSEC) 20 MG DR capsule TAKE 1 CAPSULE (20 MG) BY MOUTH ONCE DAILY ON AN EMPTY STOMACH.      venlafaxine (EFFEXOR XR) 37.5 MG 24 hr capsule Take 1 capsule (37.5 mg) by mouth daily. Take along with 75 mg capsule for a total daily dose of 112.5 mg. 30 capsule 2    venlafaxine (EFFEXOR XR) 75 MG 24 hr capsule Take 1 capsule (75 mg) by mouth daily. Take along with 37.5 mg capsule for a total daily dose of 112.5 mg. 30 capsule 2    lisinopril (ZESTRIL) 10 MG tablet TAKE 1 TABLET (10 MG) BY MOUTH ONCE DAILY. (Patient not taking: Reported on 2024)       PAST AEDs:  None    PAST MEDICAL HISTORY:  Depression  Anxiety  Acid reflux  HTN    PAST SURGICAL HISTORY:  Knee surgery reconstructive 2009  Finger graft/seperatioin    Family History:  History of seizures:   None    SOCIAL HISTORY:  Born and raised: [place]   Education: High school, regular classes  Work:   Tobacco: None, ETOH: 10-11 beers a day, Drugs: Occasional Marijuana    Family: , Children: None    REVIEW OF SYSTEMS:   Depression and anxiety  Sleep problems.       PHYSICAL EXAMINATIONS:       AAO x 3, speech fluent and appropriate. Hearing normal. No facial weakness.    PREVIOUS DIAGNOSTIC TESTING:    MRI brain: 2024  Normal.    CT head: Normal.    EE2024  Normal      IMPRESSION:       ETOH withdrawal seizures.      Patient was seen about 4 months ago. He presented with 2 seizures likely secondary to alcohol withdrawal. He needed to be cleared for work. Since the last visit, he had no seizures. No complaints. He had MRI brain in July which was normal. He is back to work. He is now drinking 1-2 beers a day. No issues.      PLAN:    No AEDs are indicated at this time.  Patient may go back to work with no restriction. He is working on stopping drinking.  RTC in PRN.      17 min total time was spent on the day of this visit.      8 min was spent on face to face time  9 min was spent on preparation of visit to review charts and labs, ordering medications and tests, and documentation of clinical information

## 2024-10-09 NOTE — PROGRESS NOTES
"         OUTPATIENT PSYCHIATRY: TELEPHONE FOLLOW UP      Identifying Information:  Carter Ervin MRN# [id number]   Age: 34 year old YOB: 1990     Initial Psychiatry Visit Date: 6/12/24         Telephone Visit     This synchronous service was provided via telephone and was determined by the provider and patient to be an appropriate and effective means for service delivery. Patient has given verbal consent for telephone visit.    Date of service:  10/09/2024      Type of service:  Telephone visit for mental health treatment.  Time of service: 3:00 PM  Telephone Start Time: 2:53 PM Telephone End Time: 3:03 PM    Reason for telephone visit[place]  Patient location[place] Home  Provider location:  [place], [place]  Mode of Communication:  Telephone         Assessment & Plan     This patient is a 34 year old male who is seen today for follow up.    (F41.1) FAY (generalized anxiety disorder) (primary encounter diagnosis)  Plan: venlafaxine (EFFEXOR XR) 37.5 MG 24 hr capsule Take 1 capsule (37.5 mg) by mouth daily. Take along with 75 mg capsule for a total daily dose of 112.5 mg.   venlafaxine (EFFEXOR XR) 75 MG 24 hr capsule Take 1 capsule (75 mg) by mouth daily. Take along with 37.5 mg capsule for a total daily dose of 112.5 mg.   gabapentin (NEURONTIN) 300 MG capsule Take 1 capsule (300 mg) by mouth 3 times daily.      (F33.1) Major depressive disorder, recurrent episode, moderate (H)  Plan: venlafaxine (EFFEXOR XR) 37.5 MG 24 hr capsule Take 1 capsule (37.5 mg) by mouth daily. Take along with 75 mg capsule for a total daily dose of 112.5 mg.   venlafaxine (EFFEXOR XR) 75 MG 24 hr capsule Take 1 capsule (75 mg) by mouth daily. Take along with 37.5 mg capsule for a total daily dose of 112.5 mg.     (F10.20) Alcohol use disorder, moderate, dependence (H)   Comment: Carter states he drinks \"a few\" beers with supper  Plan: Continue to encourage sobriety    Laboratory: " "None    Referrals: None    Follow Up: Return to clinic in 3 months          History of Present Illness     Carter Ervin is a 34 year old male with a reported history of FAY, MDD, and alcohol use disorder, moderate, dependence who is here today for follow up. He was last seen on 8/23/24 when we increased Effexor to a total daily dose of 112.5 (Francis recommends max dose of venlafaxine is 1/2 dose max for hepatic impairment).  \"It's helping\" Still gets anxious, but it is manageable.         Psychiatric Review of Systems     Mood/Depression: a little more improvement in mood. Endorses depressed mood, low energy, insomnia, poor concentration /memory, and overwhelmed, loss of interest in activities.      Anxiety: anxiety is manageable. Excessive worry, not really anything specific, beer helped with social anxiety (social butterfly if he was drinking); ok with crowds of people, but not good with talking to people.      Panic Attacks: Denies panic attacks.      Sleep: Good - no trouble falling asleep. Trazodone 50 mg - taking 1.5 tabs - [facility] at [facility] is [facility] that.     Appetite: Good, better than it was     Concentration/Distractibility: a little bit off; able to get tasks done at work, but does go back and double and triple check. Just   Does that with other parts of his life too. Checking to make sure things are right     Activity/Energy: low energy     Current psychosocial stressors: medical issues health issues, mental health symptoms, and occupational / vocational stress     Substance Use: No change. Current use includes: alcohol, smoking     Suicidal Ideation: Denies suicidal ideation or self-harm     Homicidal Ideation: Denies homicidal ideation     Therapy: Not seeing anyone for therapy.         Past Medical/Surgical History     Medical diagnoses: History reviewed. No pertinent past medical history.    Surgical history: No past surgical history on file.         Medical Review of Systems " "    Allergies: Morphine          Vital Signs: Ht 1.753 m (5' 9\")   Wt 90.7 kg (200 lb)   BMI 29.53 kg/m            Weight: 200 lbs 0 oz  BMI: Body mass index is 29.53 kg/m .    Physical Exam: Please refer to physical exam completed by primary care provider.    10 point review of systems is otherwise negative unless noted above.           Mental Status Examination     Appearance:  unable to assess due to telephone appointment  Alertness:  alert  and oriented  Attitude:  cooperative  Behavior/Demeanor:  cooperative, pleasant  Mood:  good and was congruent to speech content.  Affect:  appropriate and in normal range, mood congruent, and intensity is normal  Speech:  regular rate and rhythm  Psychomotor Behavior:  unable to assess due to telephone appointment  Thought Process:  logical, linear, and goal oriented without any evidence of loose associations, flight of ideas, tangentiality, or circumstantiality.  Thought Content:  no evidence of suicidal ideation or homicidal ideation, no evidence of psychotic thought, no auditory hallucinations present, and no visual hallucinations present  Insight:  good  Judgment: good  Cognition:  time, person, and place  Attention Span and Concentration:  intact  Recent and Remote Memory:  intact  Language:  no obvious problem  Fund of Knowledge: appropriate  Muscle Strength and Tone: unable to assess due to telephone appointment  Gait and Station: unable to assess due to telephone appointment     These cognitive functions grossly appear as described, but were not formally tested.         Labs     24 hours: No results found for this or any previous visit (from the past 24 hour(s)).    Labs were reviewed, no concerns.         Medications                                                                  BOLD psych meds     I have reviewed this patient's current medications.    Current Outpatient Medications   Medication Sig Dispense Refill    cyproheptadine (PERIACTIN) 4 MG tablet Take " 4 mg by mouth at bedtime      gabapentin (NEURONTIN) 300 MG capsule Take 1 capsule (300 mg) by mouth 3 times daily. 90 capsule 2    omeprazole (PRILOSEC) 20 MG DR capsule TAKE 1 CAPSULE (20 MG) BY MOUTH ONCE DAILY ON AN EMPTY STOMACH.      venlafaxine (EFFEXOR XR) 37.5 MG 24 hr capsule Take 1 capsule (37.5 mg) by mouth daily. Take along with 75 mg capsule for a total daily dose of 112.5 mg. 30 capsule 2    venlafaxine (EFFEXOR XR) 75 MG 24 hr capsule Take 1 capsule (75 mg) by mouth daily. Take along with 37.5 mg capsule for a total daily dose of 112.5 mg. 30 capsule 2    lisinopril (ZESTRIL) 10 MG tablet  (Patient not taking: Reported on 10/9/2024)       No current facility-administered medications for this visit.     Reviewed PDMP: Demonstrates appropriate use    Past medication trials:   Buspar (21-23)  Celexa (21-22)  Trazodone            PHQ-9 / FAY-7                         Reviewed PHQ-9 and FAY-7 screenings.         7/17/2024     1:24 PM 8/23/2024    12:18 PM 10/9/2024    12:14 PM   PHQ-9 SCORE   PHQ-9 Total Score MyChart 10 (Moderate depression) 6 (Mild depression) 5 (Mild depression)   PHQ-9 Total Score 10 6 5         7/17/2024     1:25 PM 8/23/2024    12:19 PM 10/9/2024    12:14 PM   FAY-7 SCORE   Total Score 12 (moderate anxiety) 11 (moderate anxiety) 7 (mild anxiety)   Total Score 12 11 7              12 minutes spent by me on the date of the encounter doing chart review, patient visit, and documentation     CLAIRE Finney, PMHNP-BC    Patient was instructed to monitor for worsening symptoms and side effects from medications. If there is a serious deterioration of mood or any dangerous-type behaviors (suicidal/homicidal ideations) patient is advised to call 911 or report directly to the nearest Emergency Department.     Treatment Risk Statement: The risks, benefits, alternatives and potential adverse effects have been explained and are understood by the patient. The patient agrees to the treatment  plan with the ability to do so. The patient knows to call the clinic for any problems or access emergency care if needed.

## 2024-10-09 NOTE — PATIENT INSTRUCTIONS
Thank you for allowing Nadine Fofana DNP, APRN to participate in your care.  If you have a scheduling or an appointment question please contact our scheduling department at their direct line [phone].   ALL nursing questions or concerns can be directed to the psychiatry nurses at [phone] or [phone]

## 2025-02-05 DIAGNOSIS — F41.1 GAD (GENERALIZED ANXIETY DISORDER): ICD-10-CM

## 2025-02-05 RX ORDER — GABAPENTIN 300 MG/1
300 CAPSULE ORAL 3 TIMES DAILY
Qty: 90 CAPSULE | Refills: 0 | Status: SHIPPED | OUTPATIENT
Start: 2025-02-05

## 2025-02-05 NOTE — TELEPHONE ENCOUNTER
Gabapentin      Last Written Prescription Date:  12.31.24  Last Fill Quantity: #90,   # refills: 0  Last Office Visit: 10.9.24  Future Office visit:       Routing refill request to provider for review/approval because:  Drug not on the [facility] or [facility] refill protocol or controlled substance

## 2025-03-13 DIAGNOSIS — F41.1 GAD (GENERALIZED ANXIETY DISORDER): ICD-10-CM

## 2025-03-13 DIAGNOSIS — F33.1 MAJOR DEPRESSIVE DISORDER, RECURRENT EPISODE, MODERATE (H): ICD-10-CM

## 2025-03-14 NOTE — TELEPHONE ENCOUNTER
Effexor, Effexor      Last Written Prescription Date:  2.5.25  Last Fill Quantity: #30, #30,   # refills: 0  Last Office Visit: 10.9.24  Future Office visit:       Routing refill request to provider for review/approval because:

## 2025-03-19 RX ORDER — VENLAFAXINE HYDROCHLORIDE 75 MG/1
75 CAPSULE, EXTENDED RELEASE ORAL DAILY
Qty: 30 CAPSULE | Refills: 2 | Status: SHIPPED | OUTPATIENT
Start: 2025-03-19

## 2025-03-19 RX ORDER — VENLAFAXINE HYDROCHLORIDE 37.5 MG/1
37.5 CAPSULE, EXTENDED RELEASE ORAL DAILY
Qty: 30 CAPSULE | Refills: 2 | Status: SHIPPED | OUTPATIENT
Start: 2025-03-19

## 2025-05-11 ENCOUNTER — HEALTH MAINTENANCE LETTER (OUTPATIENT)
Age: 35
End: 2025-05-11

## 2025-06-26 DIAGNOSIS — F41.1 GAD (GENERALIZED ANXIETY DISORDER): ICD-10-CM

## 2025-06-26 DIAGNOSIS — F33.1 MAJOR DEPRESSIVE DISORDER, RECURRENT EPISODE, MODERATE (H): ICD-10-CM

## 2025-06-26 RX ORDER — VENLAFAXINE HYDROCHLORIDE 37.5 MG/1
37.5 CAPSULE, EXTENDED RELEASE ORAL DAILY
Qty: 30 CAPSULE | Refills: 0 | Status: SHIPPED | OUTPATIENT
Start: 2025-06-26

## 2025-06-26 RX ORDER — VENLAFAXINE HYDROCHLORIDE 75 MG/1
75 CAPSULE, EXTENDED RELEASE ORAL DAILY
Qty: 30 CAPSULE | Refills: 0 | Status: SHIPPED | OUTPATIENT
Start: 2025-06-26

## 2025-06-26 NOTE — TELEPHONE ENCOUNTER
Effexor, Effexor      Last Written Prescription Date:  5.21.25  Last Fill Quantity: #30, #30,   # refills: 0  Last Office Visit: 10.9.24  Future Office visit:       Routing refill request to provider for review/approval because:

## 2025-07-28 ENCOUNTER — TRANSFERRED RECORDS (OUTPATIENT)
Dept: HEALTH INFORMATION MANAGEMENT | Facility: HOSPITAL | Age: 35
End: 2025-07-28